# Patient Record
Sex: FEMALE | Race: BLACK OR AFRICAN AMERICAN | Employment: OTHER | URBAN - METROPOLITAN AREA
[De-identification: names, ages, dates, MRNs, and addresses within clinical notes are randomized per-mention and may not be internally consistent; named-entity substitution may affect disease eponyms.]

---

## 2017-02-12 ENCOUNTER — HOSPITAL ENCOUNTER (EMERGENCY)
Age: 67
Discharge: HOME | End: 2017-02-12
Attending: EMERGENCY MEDICINE
Payer: MEDICARE

## 2017-02-12 VITALS
TEMPERATURE: 101.2 F | BODY MASS INDEX: 47.09 KG/M2 | SYSTOLIC BLOOD PRESSURE: 164 MMHG | RESPIRATION RATE: 20 BRPM | DIASTOLIC BLOOD PRESSURE: 96 MMHG | OXYGEN SATURATION: 96 % | WEIGHT: 293 LBS | HEIGHT: 66 IN

## 2017-02-12 DIAGNOSIS — B34.9 VIRAL ILLNESS: Primary | ICD-10-CM

## 2017-02-12 DIAGNOSIS — I10 ESSENTIAL HYPERTENSION: ICD-10-CM

## 2017-02-12 DIAGNOSIS — R50.81 FEVER IN OTHER DISEASES: ICD-10-CM

## 2017-02-12 PROCEDURE — 99282 EMERGENCY DEPT VISIT SF MDM: CPT

## 2017-02-12 PROCEDURE — 74011250637 HC RX REV CODE- 250/637: Performed by: PHYSICIAN ASSISTANT

## 2017-02-12 PROCEDURE — 74011250637 HC RX REV CODE- 250/637: Performed by: EMERGENCY MEDICINE

## 2017-02-12 RX ORDER — IBUPROFEN 600 MG/1
600 TABLET ORAL
Status: COMPLETED | OUTPATIENT
Start: 2017-02-12 | End: 2017-02-12

## 2017-02-12 RX ORDER — ACETAMINOPHEN 325 MG/1
650 TABLET ORAL
Status: COMPLETED | OUTPATIENT
Start: 2017-02-12 | End: 2017-02-12

## 2017-02-12 RX ORDER — FLUTICASONE PROPIONATE 50 MCG
2 SPRAY, SUSPENSION (ML) NASAL DAILY
Qty: 1 BOTTLE | Refills: 0 | Status: SHIPPED | OUTPATIENT
Start: 2017-02-12 | End: 2017-08-09

## 2017-02-12 RX ORDER — HYDROCODONE POLISTIREX AND CHLORPHENIRAMINE POLISTIREX 10; 8 MG/5ML; MG/5ML
5 SUSPENSION, EXTENDED RELEASE ORAL
Qty: 60 ML | Refills: 0 | Status: SHIPPED | OUTPATIENT
Start: 2017-02-12 | End: 2017-04-12

## 2017-02-12 RX ADMIN — ACETAMINOPHEN 650 MG: 325 TABLET, FILM COATED ORAL at 15:00

## 2017-02-12 RX ADMIN — IBUPROFEN 600 MG: 600 TABLET, FILM COATED ORAL at 16:06

## 2017-02-12 NOTE — ED PROVIDER NOTES
HPI Comments: Haseeb Kenyon is a 77 y.o. female th tpresents to the ED with a complaint of cough fever and headache x3 days. Patient states that she has not taken any home medications. Denies CP, SOB, NVD. Patient is a 77 y.o. female presenting with cough, headaches, and fever. Cough   Associated symptoms include headaches. Headache    Associated symptoms include a fever. Fever    Associated symptoms include headaches and cough. Past Medical History:   Diagnosis Date    Burning with urination     Essential hypertension        Past Surgical History:   Procedure Laterality Date    Hx hysterectomy           No family history on file. Social History     Social History    Marital status: SINGLE     Spouse name: N/A    Number of children: N/A    Years of education: N/A     Occupational History    Not on file. Social History Main Topics    Smoking status: Never Smoker    Smokeless tobacco: Never Used    Alcohol use No    Drug use: No    Sexual activity: Not on file     Other Topics Concern    Not on file     Social History Narrative         ALLERGIES: Review of patient's allergies indicates no known allergies. Review of Systems   Constitutional: Positive for fever. Respiratory: Positive for cough. Neurological: Positive for headaches. All other systems reviewed and are negative. Vitals:    02/12/17 1456   BP: (!) 164/96   Resp: 20   Temp: (!) 101.2 °F (38.4 °C)   SpO2: 96%   Weight: 149.7 kg (330 lb)   Height: 5' 6\" (1.676 m)            Physical Exam   Constitutional: She is oriented to person, place, and time. She appears well-developed and well-nourished. She appears distressed. Patient appears obese     HENT:   Head: Normocephalic and atraumatic. Right Ear: External ear normal.   Left Ear: External ear normal.   Nose: Mucosal edema present. mallampatti score 4    Eyes: Conjunctivae are normal. Pupils are equal, round, and reactive to light.    Neck: Normal range of motion. Neck supple. Cardiovascular: Normal rate, regular rhythm and normal heart sounds. Pulmonary/Chest: Effort normal and breath sounds normal. No respiratory distress. Musculoskeletal: Normal range of motion. Neurological: She is alert and oriented to person, place, and time. Skin: Skin is warm and dry. Psychiatric: She has a normal mood and affect. Her behavior is normal.        MDM  Number of Diagnoses or Management Options  Fever in other diseases:   Viral illness:   Diagnosis management comments: BP elevated in ED- bP states that she has not taken any of her medications today.     Impression: viral illness, fever    Plan: Discharge home  Increase fluids  Afrin and Flonase as directed  Tylenol and motrin for fever  Cough medication as directed  Follow up with PCP    ED Course       Procedures

## 2017-02-12 NOTE — ED NOTES
Both written and verbal discharge instructions given to pt with verbalized understanding of home care and follow up. Prescription given.

## 2017-02-12 NOTE — DISCHARGE INSTRUCTIONS
Learning About Fever  What is a fever? A fever is a high body temperature. It's one way your body fights being sick. A fever shows that the body is responding to infection or other illnesses, both minor and severe. A fever is a symptom, not an illness by itself. A fever can be a sign that you are ill, but most fevers are not caused by a serious problem. You may have a fever with a minor illness, such as a cold. But sometimes a very serious infection may cause little or no fever. It is important to look at other symptoms, other conditions you have, and how you feel in general. In children, notice how they act and see what symptoms they complain of. What is a normal body temperature? A normal body temperature is about 98. 6ºF. Some people have a normal temperature that is a little higher or a little lower than this. Your temperature may be a little lower in the morning than it is later in the day. It may go up during hot weather or when you exercise, wear heavy clothes, or take a hot bath. Your temperature may also be different depending on how you take it. A temperature taken in the mouth (oral) or under the arm may be a little lower than your core temperature (rectal). What is a fever temperature? A core temperature of 100.4°F or above is considered a fever. What can cause a fever? A fever may be caused by:  · Infections. This is the most common cause of a fever. Examples of infections that can cause a fever include the flu, a kidney infection, or pneumonia. · Some medicines. · Severe trauma or injury, such as a heart attack, stroke, heatstroke, or burns. · Other medical conditions, such as arthritis and some cancers. How can you treat a fever at home? · Ask your doctor if you can take an over-the-counter pain medicine, such as acetaminophen (Tylenol), ibuprofen (Advil, Motrin), or naproxen (Aleve). Be safe with medicines. Read and follow all instructions on the label.   · To prevent dehydration, drink plenty of fluids. Choose water and other caffeine-free clear liquids until you feel better. If you have kidney, heart, or liver disease and have to limit fluids, talk with your doctor before you increase the amount of fluids you drink. Follow-up care is a key part of your treatment and safety. Be sure to make and go to all appointments, and call your doctor if you are having problems. It's also a good idea to know your test results and keep a list of the medicines you take. Where can you learn more? Go to http://idalia-maddison.info/. Enter A149 in the search box to learn more about \"Learning About Fever. \"  Current as of: May 27, 2016  Content Version: 11.1  © 6865-2636 [employer]. Care instructions adapted under license by [employer] (which disclaims liability or warranty for this information). If you have questions about a medical condition or this instruction, always ask your healthcare professional. [address] any warranty or liability for your use of this information. Viral Infections: Care Instructions  Your Care Instructions  You don't feel well, but it's not clear what's causing it. You may have a viral infection. Viruses cause many illnesses, such as the common cold, influenza, fever, rashes, and the diarrhea, nausea, and vomiting that are often called \"stomach flu. \" You may wonder if antibiotic medicines could make you feel better. But antibiotics only treat infections caused by bacteria. They don't work on viruses. The good news is that viral infections usually aren't serious. Most will go away in a few days without medical treatment. In the meantime, there are a few things you can do to make yourself more comfortable. Follow-up care is a key part of your treatment and safety. Be sure to make and go to all appointments, and call your doctor if you are having problems.  It's also a good idea to know your test results and keep a list of the medicines you take. How can you care for yourself at home? · Get plenty of rest if you feel tired. · Take an over-the-counter pain medicine if needed, such as acetaminophen (Tylenol), ibuprofen (Advil, Motrin), or naproxen (Aleve). Read and follow all instructions on the label. · Be careful when taking over-the-counter cold or flu medicines and Tylenol at the same time. Many of these medicines have acetaminophen, which is Tylenol. Read the labels to make sure that you are not taking more than the recommended dose. Too much acetaminophen (Tylenol) can be harmful. · Drink plenty of fluids, enough so that your urine is light yellow or clear like water. If you have kidney, heart, or liver disease and have to limit fluids, talk with your doctor before you increase the amount of fluids you drink. · Stay home from work, school, and other public places while you have a fever. When should you call for help? Call 911 anytime you think you may need emergency care. For example, call if:  · You have severe trouble breathing. · You passed out (lost consciousness). Call your doctor now or seek immediate medical care if:  · You seem to be getting much sicker. · You have a new or higher fever. · You have blood in your stools. · You have new belly pain, or your pain gets worse. · You have a new rash. Watch closely for changes in your health, and be sure to contact your doctor if:  · You start to get better and then get worse. · You do not get better as expected. Where can you learn more? Go to http://idalia-maddison.info/. Enter I311 in the search box to learn more about \"Viral Infections: Care Instructions. \"  Current as of: May 24, 2016  Content Version: 11.1  © 4336-8734 [employer]. Care instructions adapted under license by [employer] (which disclaims liability or warranty for this information).  If you have questions about a medical condition or this instruction, always ask your healthcare professional. [place] [address] any warranty or liability for your use of this information.

## 2017-04-24 ENCOUNTER — ANESTHESIA EVENT (OUTPATIENT)
Dept: ENDOSCOPY | Age: 67
End: 2017-04-24
Payer: MEDICARE

## 2017-04-25 ENCOUNTER — HOSPITAL ENCOUNTER (OUTPATIENT)
Age: 67
Setting detail: OUTPATIENT SURGERY
Discharge: HOME | End: 2017-04-25
Attending: INTERNAL MEDICINE | Admitting: INTERNAL MEDICINE
Payer: MEDICARE

## 2017-04-25 ENCOUNTER — ANESTHESIA (OUTPATIENT)
Dept: ENDOSCOPY | Age: 67
End: 2017-04-25
Payer: MEDICARE

## 2017-04-25 VITALS
BODY MASS INDEX: 47.09 KG/M2 | OXYGEN SATURATION: 96 % | DIASTOLIC BLOOD PRESSURE: 67 MMHG | SYSTOLIC BLOOD PRESSURE: 122 MMHG | HEIGHT: 66 IN | HEART RATE: 73 BPM | WEIGHT: 293 LBS | TEMPERATURE: 97.4 F | RESPIRATION RATE: 17 BRPM

## 2017-04-25 LAB
GLUCOSE BLD STRIP.AUTO-MCNC: 108 MG/DL (ref 70–110)
GLUCOSE BLD STRIP.AUTO-MCNC: 115 MG/DL (ref 70–110)

## 2017-04-25 PROCEDURE — 74011000250 HC RX REV CODE- 250: Performed by: NURSE ANESTHETIST, CERTIFIED REGISTERED

## 2017-04-25 PROCEDURE — 76040000019: Performed by: INTERNAL MEDICINE

## 2017-04-25 PROCEDURE — 88305 TISSUE EXAM BY PATHOLOGIST: CPT | Performed by: INTERNAL MEDICINE

## 2017-04-25 PROCEDURE — 74011250636 HC RX REV CODE- 250/636

## 2017-04-25 PROCEDURE — 77030018846 HC SOL IRR STRL H20 ICUM -A: Performed by: INTERNAL MEDICINE

## 2017-04-25 PROCEDURE — 77030009426 HC FCPS BIOP ENDOSC BSC -B: Performed by: INTERNAL MEDICINE

## 2017-04-25 PROCEDURE — 77030008565 HC TBNG SUC IRR ERBE -B: Performed by: INTERNAL MEDICINE

## 2017-04-25 PROCEDURE — 82962 GLUCOSE BLOOD TEST: CPT

## 2017-04-25 PROCEDURE — 74011000250 HC RX REV CODE- 250

## 2017-04-25 PROCEDURE — 76060000031 HC ANESTHESIA FIRST 0.5 HR: Performed by: INTERNAL MEDICINE

## 2017-04-25 PROCEDURE — 74011250636 HC RX REV CODE- 250/636: Performed by: NURSE ANESTHETIST, CERTIFIED REGISTERED

## 2017-04-25 RX ORDER — SODIUM CHLORIDE 0.9 % (FLUSH) 0.9 %
5-10 SYRINGE (ML) INJECTION AS NEEDED
Status: DISCONTINUED | OUTPATIENT
Start: 2017-04-25 | End: 2017-04-25 | Stop reason: HOSPADM

## 2017-04-25 RX ORDER — SODIUM CHLORIDE, SODIUM LACTATE, POTASSIUM CHLORIDE, CALCIUM CHLORIDE 600; 310; 30; 20 MG/100ML; MG/100ML; MG/100ML; MG/100ML
50 INJECTION, SOLUTION INTRAVENOUS CONTINUOUS
Status: DISCONTINUED | OUTPATIENT
Start: 2017-04-25 | End: 2017-04-25 | Stop reason: HOSPADM

## 2017-04-25 RX ORDER — ONDANSETRON 2 MG/ML
4 INJECTION INTRAMUSCULAR; INTRAVENOUS ONCE
Status: DISCONTINUED | OUTPATIENT
Start: 2017-04-25 | End: 2017-04-25 | Stop reason: HOSPADM

## 2017-04-25 RX ORDER — PROPOFOL 10 MG/ML
INJECTION, EMULSION INTRAVENOUS AS NEEDED
Status: DISCONTINUED | OUTPATIENT
Start: 2017-04-25 | End: 2017-04-25 | Stop reason: HOSPADM

## 2017-04-25 RX ORDER — ACETAMINOPHEN 500 MG
1000 TABLET ORAL AS NEEDED
COMMUNITY
End: 2017-08-09

## 2017-04-25 RX ORDER — PROPOFOL 10 MG/ML
INJECTION, EMULSION INTRAVENOUS
Status: DISCONTINUED | OUTPATIENT
Start: 2017-04-25 | End: 2017-04-25 | Stop reason: HOSPADM

## 2017-04-25 RX ORDER — MAGNESIUM SULFATE 100 %
4 CRYSTALS MISCELLANEOUS AS NEEDED
Status: DISCONTINUED | OUTPATIENT
Start: 2017-04-25 | End: 2017-04-25 | Stop reason: HOSPADM

## 2017-04-25 RX ORDER — SODIUM CHLORIDE, SODIUM LACTATE, POTASSIUM CHLORIDE, CALCIUM CHLORIDE 600; 310; 30; 20 MG/100ML; MG/100ML; MG/100ML; MG/100ML
75 INJECTION, SOLUTION INTRAVENOUS CONTINUOUS
Status: DISCONTINUED | OUTPATIENT
Start: 2017-04-25 | End: 2017-04-25 | Stop reason: HOSPADM

## 2017-04-25 RX ORDER — LIDOCAINE HYDROCHLORIDE 20 MG/ML
INJECTION, SOLUTION EPIDURAL; INFILTRATION; INTRACAUDAL; PERINEURAL AS NEEDED
Status: DISCONTINUED | OUTPATIENT
Start: 2017-04-25 | End: 2017-04-25 | Stop reason: HOSPADM

## 2017-04-25 RX ORDER — DEXTROSE 50 % IN WATER (D50W) INTRAVENOUS SYRINGE
25-50 AS NEEDED
Status: DISCONTINUED | OUTPATIENT
Start: 2017-04-25 | End: 2017-04-25 | Stop reason: HOSPADM

## 2017-04-25 RX ORDER — FAMOTIDINE 10 MG/ML
20 INJECTION INTRAVENOUS ONCE
Status: COMPLETED | OUTPATIENT
Start: 2017-04-25 | End: 2017-04-25

## 2017-04-25 RX ORDER — LIDOCAINE HYDROCHLORIDE 10 MG/ML
0.1 INJECTION, SOLUTION EPIDURAL; INFILTRATION; INTRACAUDAL; PERINEURAL AS NEEDED
Status: DISCONTINUED | OUTPATIENT
Start: 2017-04-25 | End: 2017-04-25 | Stop reason: HOSPADM

## 2017-04-25 RX ORDER — INSULIN LISPRO 100 [IU]/ML
INJECTION, SOLUTION INTRAVENOUS; SUBCUTANEOUS ONCE
Status: DISCONTINUED | OUTPATIENT
Start: 2017-04-25 | End: 2017-04-25 | Stop reason: HOSPADM

## 2017-04-25 RX ADMIN — LIDOCAINE HYDROCHLORIDE 80 MG: 20 INJECTION, SOLUTION EPIDURAL; INFILTRATION; INTRACAUDAL; PERINEURAL at 09:44

## 2017-04-25 RX ADMIN — PROPOFOL 100 MCG/KG/MIN: 10 INJECTION, EMULSION INTRAVENOUS at 09:44

## 2017-04-25 RX ADMIN — PROPOFOL 50 MG: 10 INJECTION, EMULSION INTRAVENOUS at 09:49

## 2017-04-25 RX ADMIN — SODIUM CHLORIDE, SODIUM LACTATE, POTASSIUM CHLORIDE, AND CALCIUM CHLORIDE 50 ML/HR: 600; 310; 30; 20 INJECTION, SOLUTION INTRAVENOUS at 08:45

## 2017-04-25 RX ADMIN — FAMOTIDINE 20 MG: 10 INJECTION, SOLUTION INTRAVENOUS at 08:45

## 2017-04-25 RX ADMIN — PROPOFOL 50 MG: 10 INJECTION, EMULSION INTRAVENOUS at 09:44

## 2017-04-25 NOTE — ANESTHESIA POSTPROCEDURE EVALUATION
Post-Anesthesia Evaluation and Assessment    Patient: Ottoniel Lechuga MRN: [id number]  SSN: [id number]    YOB: 1950  Age: 79 y.o. Sex: female       Cardiovascular Function/Vital Signs  Visit Vitals    /58    Pulse 77    Temp 36.7 °C (98 °F)    Resp 21    Ht 5' 6\" (1.676 m)    Wt 148.1 kg (326 lb 8 oz)    SpO2 99%    BMI 52.7 kg/m2       Patient is status post MAC anesthesia for Procedure(s):  COLONOSCOPY with polyp biopsy. Nausea/Vomiting: None    Postoperative hydration reviewed and adequate. Pain:  Pain Scale 1: Numeric (0 - 10) (04/25/17 1000)  Pain Intensity 1: 0 (04/25/17 1000)   Managed    Neurological Status:   Neuro (WDL): Within Defined Limits (04/25/17 0858)   At baseline    Mental Status and Level of Consciousness: Arousable    Pulmonary Status:   O2 Device: Room air (04/25/17 1008)   Adequate oxygenation and airway patent    Complications related to anesthesia: None    Post-anesthesia assessment completed.  No concerns    Signed By: Alphonso White MD     April 25, 2017

## 2017-04-25 NOTE — H&P
[email or web address]  [phone]    Gastroenterology pre op History and Physical     Impression:   1. High risk colon cancer screening exam      Plan:     1. Colonoscopy      Chief Complaint: high risk colon cancer screening exam.      HPI:  Dmitry Norton is a 79 y.o. female who is being seen on consult for high risk colon cancer screening with colonoscopy. There is a previous history of colon polyps, and the patient returns for a surveillence exam    PMH:   Past Medical History:   Diagnosis Date    Burning with urination     Diabetes ([address]     prediabetes    Essential hypertension     Ill-defined condition     High Cholesterol       PSH:   Past Surgical History:   Procedure Laterality Date    HX COLONOSCOPY      HX HYSTERECTOMY         Social HX:   Social History     Social History    Marital status: SINGLE     Spouse name: N/A    Number of children: N/A    Years of education: N/A     Occupational History    Not on file. Social History Main Topics    Smoking status: Never Smoker    Smokeless tobacco: Never Used    Alcohol use No    Drug use: No    Sexual activity: Not on file     Other Topics Concern    Not on file     Social History Narrative       FHX:   History reviewed. No pertinent family history. Allergy:   No Known Allergies    Home Medications:     Prescriptions Prior to Admission   Medication Sig    acetaminophen (TYLENOL EXTRA STRENGTH) 500 mg tablet Take 1,000 mg by mouth as needed for Pain.  losartan (COZAAR) 100 mg tablet Take 100 mg by mouth nightly.  amLODIPine (NORVASC) 10 mg tablet Take  by mouth nightly.  rosuvastatin (CRESTOR) 10 mg tablet Take 10 mg by mouth nightly.  aspirin delayed-release 81 mg tablet Take  by mouth daily.  Cholecalciferol, Vitamin D3, 1,000 unit cap Take  by mouth.  fluticasone (FLONASE) 50 mcg/actuation nasal spray 2 Sprays by Both Nostrils route daily.        Review of Systems:     Constitutional: No fevers, chills, weight loss, fatigue. Skin: No rashes, pruritis, jaundice, ulcerations, erythema. HENT: No headaches, nosebleeds, sinus pressure, rhinorrhea, sore throat. Eyes: No visual changes, blurred vision, eye pain, photophobia, jaundice. Cardiovascular: No chest pain, heart palpitations. Respiratory: No cough, SOB, wheezing, chest discomfort, orthopnea. Gastrointestinal:    Genitourinary: No dysuria, bleeding, discharge, pyuria. Musculoskeletal: No weakness, arthralgias, wasting. Endo: No sweats. Heme: No bruising, easy bleeding. Allergies: As noted. Neurological: Cranial nerves intact. Alert and oriented. Gait not assessed. Psychiatric:  No anxiety, depression, hallucinations. Visit Vitals    /87 (BP 1 Location: Left arm, BP Patient Position: At rest)    Pulse 88    Temp 98.4 °F (36.9 °C)    Resp 20    Ht 5' 6\" (1.676 m)    Wt 148.1 kg (326 lb 8 oz)    SpO2 94%    BMI 52.7 kg/m2       Physical Assessment:     constitutional: appearance: well developed, well nourished, normal habitus, no deformities, in no acute distress. skin: inspection: no rashes, ulcers, icterus or other lesions; no clubbing or telangiectasias. palpation: no induration or subcutaneos nodules. eyes: inspection: normal conjunctivae and lids; no jaundice pupils: symmetrical, normoreactive to light, normal accommodation and size. ENMT: mouth: normal oral mucosa,lips and gums; good dentition. oropharynx: normal tongue, hard and soft palate; posterior pharynx without erithema, exudate or lesions. neck: thyroid: normal size, consistency and position; no masses or tenderness. respiratory: effort: normal chest excursion; no intercostal retraction or accessory muscle use. cardiovascular: abdominal aorta: normal size and position; no bruits. palpation: PMI of normal size and position; normal rhythm; no thrill or murmurs. abdominal: abdomen: normal consistency; no tenderness or masses.  hernias: no hernias appreciated. liver: normal size and consistency. spleen: not palpable. rectal: hemoccult/guaiac: not performed. musculoskeletal: digits and nails: no clubbing, cyanosis, petechiae or other inflammatory conditions. gait: normal gait and station head and neck: normal range of motion; no pain, crepitation or contracture. spine/ribs/pelvis: normal range of motion; no pain, deformity or contracture. lymphatic: axilae: not palpable. groin: not palpable. neck: within normal limits. other: not palpable. neurologic: cranial nerves: II-XII normal.   psychiatric: judgement/insight: within normal limits. memory: within normal limits for recent and remote events. mood and affect: no evidence of depression, anxiety or agitation. orientation: oriented to time, space and person. Basic Metabolic Profile   No results for input(s): NA, K, CL, CO2, BUN, GLU, CA, MG, PHOS in the last 72 hours. No lab exists for component: CREAT      CBC w/Diff    No results for input(s): WBC, RBC, HGB, HCT, MCV, MCH, MCHC, RDW, PLT, HGBEXT, HCTEXT, PLTEXT in the last 72 hours. No lab exists for component: MPV No results for input(s): GRANS, LYMPH, EOS, PRO, MYELO, METAS, BLAST in the last 72 hours. No lab exists for component: MONO, BASO     Hepatic Function   No results for input(s): ALB, TP, TBILI, GPT, SGOT, AP, AML, LPSE in the last 72 hours. No lab exists for component: JESSICA Alegria MD  [facility], [address] [place] [address]  [email or web address]

## 2017-04-25 NOTE — IP AVS SNAPSHOT
[address] [facility] 
 
 
 [address] [facility] [address] 
[phone] Patient: Kacy Kim MRN: [id number] FQI:[id number]/[id number] You are allergic to the following No active allergies Recent Documentation Height Weight BMI OB Status Smoking Status 1.676 m 148.1 kg 52.7 kg/m2 Hysterectomy Never Smoker Emergency Contacts Name Discharge Info Relation Home Work Mobile Rand Tello (Niece)  Other Relative [6] [phone] Justyna Sandhu DISCHARGE CAREGIVER [3] Friend [5] [phone] About your hospitalization You were admitted on:  April 25, 2017 You last received care in the:  [facility] PAC[facility] You were discharged on:  April 25, 2017 Unit phone number:  [phone] Why you were hospitalized Your primary diagnosis was:  Not on File Providers Seen During Your Hospitalizations Provider Role Specialty Primary office phone Ute Sims MD Attending Provider Gastroenterology [phone] Your Primary Care Physician (PCP) Primary Care Physician Office Phone Office Fax [phone] Jewel Nguyen,[facility], [address] [facility] [phone] Follow-up Information Follow up With Details Comments Contact Info Ana Thompson MD   [address] [place] [address] [phone] Ute Sims MD  Follow up as needed. Repeat colonoscopy in 5 years. [address] [place] [address] Drive [address] [facility] [address] 
[phone] Current Discharge Medication List  
  
CONTINUE these medications which have NOT CHANGED Dose & Instructions Dispensing Information Comments Morning Noon Evening Bedtime  
 aspirin delayed-release 81 mg tablet Your last dose was: Your next dose is: Take  by mouth daily. Refills:  0  
     
   
   
   
  
 cholecalciferol 1,000 unit Cap Commonly known as:  VITAMIN D3 Your last dose was: Your next dose is: Take  by mouth. Refills:  0  
     
   
   
   
  
 COZAAR 100 mg tablet Generic drug:  losartan Your last dose was: Your next dose is:    
   
   
 Dose:  100 mg Take 100 mg by mouth nightly. Refills:  0  
     
   
   
   
  
 CRESTOR 10 mg tablet Generic drug:  rosuvastatin Your last dose was: Your next dose is:    
   
   
 Dose:  10 mg Take 10 mg by mouth nightly. Refills:  0  
     
   
   
   
  
 fluticasone 50 mcg/actuation nasal spray Commonly known as:  Antonieta Earnest Your last dose was: Your next dose is:    
   
   
 Dose:  2 Spray 2 Sprays by Both Nostrils route daily. Quantity:  1 Bottle Refills:  0 NORVASC 10 mg tablet Generic drug:  amLODIPine Your last dose was: Your next dose is: Take  by mouth nightly. Refills:  0  
     
   
   
   
  
 TYLENOL EXTRA STRENGTH 500 mg tablet Generic drug:  acetaminophen Your last dose was: Your next dose is:    
   
   
 Dose:  1000 mg Take 1,000 mg by mouth as needed for Pain. Refills:  0 Discharge Instructions Colonoscopy: What to Expect at [facility] Your Recovery After you have a colonoscopy, you will stay at the clinic for 1 to 2 hours until the medicines wear off. Then you can go home. But you will need to arrange for a ride. Your doctor will tell you when you can eat and do your other usual activities. Your doctor will talk to you about when you will need your next colonoscopy. Your doctor can help you decide how often you need to be checked. This will depend on the results of your test and your risk for colorectal cancer. After the test, you may be bloated or have gas pains. You may need to pass gas.  If a biopsy was done or a polyp was removed, you may have streaks of blood in your stool (feces) for a few days. This care sheet gives you a general idea about how long it will take for you to recover. But each person recovers at a different pace. Follow the steps below to get better as quickly as possible. How can you care for yourself at home? Activity · Rest when you feel tired. · You can do your normal activities when it feels okay to do so. Diet · Follow your doctor's directions for eating. · Unless your doctor has told you not to, drink plenty of fluids. This helps to replace the fluids that were lost during the colon prep. · Do not drink alcohol. Medicines · Your doctor will tell you if and when you can restart your medicines. He or she will also give you instructions about taking any new medicines. · If you take blood thinners, such as warfarin (Coumadin), clopidogrel (Plavix), or aspirin, be sure to talk to your doctor. He or she will tell you if and when to start taking those medicines again. Make sure that you understand exactly what your doctor wants you to do. · If polyps were removed or a biopsy was done during the test, your doctor may tell you not to take aspirin or other anti-inflammatory medicines for a few days. These include ibuprofen (Advil, Motrin) and naproxen (Aleve). Other instructions · For your safety, do not drive or operate machinery until the medicine wears off and you can think clearly. Your doctor may tell you not to drive or operate machinery until the day after your test. 
· Do not sign legal documents or make major decisions until the medicine wears off and you can think clearly. The anesthesia can make it hard for you to fully understand what you are agreeing to. Follow-up care is a key part of your treatment and safety. Be sure to make and go to all appointments, and call your doctor if you are having problems. It's also a good idea to know your test results and keep a list of the medicines you take. When should you call for help? Call 911 anytime you think you may need emergency care. For example, call if: 
· You passed out (lost consciousness). · You pass maroon or bloody stools. · You have severe belly pain. Call your doctor now or seek immediate medical care if: 
· Your stools are black and tarlike. · Your stools have streaks of blood, but you did not have a biopsy or any polyps removed. · You have belly pain, or your belly is swollen and firm. · You vomit. · You have a fever. · You are very dizzy. Watch closely for changes in your health, and be sure to contact your doctor if you have any problems. Where can you learn more? Go to http://idalia-maddison.info/. Enter E264 in the search box to learn more about \"Colonoscopy: What to Expect at Home. \" Current as of: August 9, 2016 Content Version: 11.2 © 7092-4720 [employer]. Care instructions adapted under license by [employer] (which disclaims liability or warranty for this information). If you have questions about a medical condition or this instruction, always ask your healthcare professional. [place] [address] any warranty or liability for your use of this information. Colon Polyps: Care Instructions Your Care Instructions Colon polyps are growths in the colon or the rectum. The cause of most colon polyps is not known, and most people who get them do not have any problems. But a certain kind can turn into cancer. For this reason, regular testing for colon polyps is important for people age 48 and older and anyone who has an increased risk for colon cancer. Polyps are usually found through routine colon cancer screening tests. Although most colon polyps are not cancerous, they are usually removed and then tested for cancer. Screening for colon cancer saves lives because the cancer can usually be cured if it is caught early. If you have a polyp that is the type that can turn into cancer, you may need more tests to examine your entire colon. The doctor will remove any other polyps that he or she finds, and you will be tested more often. Follow-up care is a key part of your treatment and safety. Be sure to make and go to all appointments, and call your doctor if you are having problems. It's also a good idea to know your test results and keep a list of the medicines you take. How can you care for yourself at home? Regular exams to look for colon polyps are the best way to prevent polyps from turning into colon cancer. These can include stool tests, sigmoidoscopy, colonoscopy, and CT colonography. Talk with your doctor about a testing schedule that is right for you. To prevent polyps There is no home treatment that can prevent colon polyps. But these steps may help lower your risk for cancer. · Stay active. Being active can help you get to and stay at a healthy weight. Try to exercise on most days of the week. Walking is a good choice. · Eat well. Choose a variety of vegetables, fruits, legumes (such as peas and beans), fish, poultry, and whole grains. · Do not smoke. If you need help quitting, talk to your doctor about stop-smoking programs and medicines. These can increase your chances of quitting for good. · If you drink alcohol, limit how much you drink. Limit alcohol to 2 drinks a day for men and 1 drink a day for women. When should you call for help? Call your doctor now or seek immediate medical care if: 
· You have severe belly pain. · Your stools are maroon or very bloody. Watch closely for changes in your health, and be sure to contact your doctor if: 
· You have a fever. · You have nausea or vomiting. · You have a change in bowel habits (new constipation or diarrhea). · Your symptoms get worse or are not improving as expected. Where can you learn more? Go to http://idalia-maddison.info/. Enter 95 611919 in the search box to learn more about \"Colon Polyps: Care Instructions. \" Current as of: August 24, 2016 Content Version: 11.2 © 5867-6013 [employer]. Care instructions adapted under license by [employer] (which disclaims liability or warranty for this information). If you have questions about a medical condition or this instruction, always ask your healthcare professional. [place] [address] any warranty or liability for your use of this information. Learning About Diverticulosis and Diverticulitis What are diverticulosis and diverticulitis? In diverticulosis and diverticulitis, pouches called diverticula form in the wall of the large intestine, or colon. · In diverticulosis, the pouches do not cause any pain or other symptoms. · In diverticulitis, the pouches get inflamed or infected and cause symptoms. Doctors aren't sure what causes these pouches in the colon. But they think that a low-fiber diet may play a role. Without fiber to add bulk to the stool, the colon has to work harder than normal to push the stool forward. The pressure from this may cause pouches to form in weak spots along the colon. Some people with diverticulosis get diverticulitis. But experts don't know why this happens. What are the symptoms? · In diverticulosis, most people don't have symptoms. But pouches sometimes bleed. · In diverticulitis, symptoms may last from a few hours to a week or more. They include: ¨ Belly pain. This is usually in the lower left side. It is sometimes worse when you move. This is the most common symptom. ¨ Fever and chills. ¨ Bloating and gas. ¨ Diarrhea or constipation. ¨ Nausea and sometimes vomiting. ¨ Not feeling like eating. How can you prevent these problems? You may be able to lower your chance of getting diverticulitis. You can do this by taking steps to prevent constipation. · Eat fruits, vegetables, beans, and whole grains every day. These foods are high in fiber. · Drink plenty of fluids (enough so that your urine is light yellow or clear like water). If you have kidney, heart, or liver disease and have to limit fluids, talk with your doctor before you increase the amount of fluids you drink. · Get at least 30 minutes of exercise on most days of the week. Walking is a good choice. You also may want to do other activities, such as running, swimming, cycling, or playing tennis or team sports. · Take a fiber supplement, such as Citrucel or Metamucil, every day if needed. Read and follow all instructions on the label. · Schedule time each day for a bowel movement. Having a daily routine may help. Take your time and do not strain when having a bowel movement. Some people avoid nuts, seeds, berries, and popcorn. They believe that these foods might get trapped in the diverticula and cause pain. But there is no proof that these foods cause diverticulitis or make it worse. How are these problems treated? · The best way to treat diverticulosis is to avoid constipation. (See the tips above.) · Treatment for diverticulitis includes antibiotics and often a change in your diet. You may need only liquids at first. Your doctor may suggest pain medicines for pain or belly cramps. In some cases, surgery may be needed. Follow-up care is a key part of your treatment and safety. Be sure to make and go to all appointments, and call your doctor if you are having problems. It's also a good idea to know your test results and keep a list of the medicines you take. Where can you learn more? Go to http://idalia-maddison.info/. Enter M102 in the search box to learn more about \"Learning About Diverticulosis and Diverticulitis. \" Current as of: August 9, 2016 Content Version: 11.2 © 1808-8271 [employer], 10X10 Room.  Care instructions adapted under license by [address] (which disclaims liability or warranty for this information). If you have questions about a medical condition or this instruction, always ask your healthcare professional. [address] any warranty or liability for your use of this information. DISCHARGE SUMMARY from Nurse The following personal items are in your possession at time of discharge: 
 
Dental Appliances: None Visual Aid: Glasses PATIENT INSTRUCTIONS: 
 
 
F-face looks uneven A-arms unable to move or move unevenly S-speech slurred or non-existent T-time-call 911 as soon as signs and symptoms begin-DO NOT go Back to bed or wait to see if you get better-TIME IS BRAIN. Warning Signs of HEART ATTACK Call 911 if you have these symptoms: 
? Chest discomfort. Most heart attacks involve discomfort in the center of the chest that lasts more than a few minutes, or that goes away and comes back. It can feel like uncomfortable pressure, squeezing, fullness, or pain. ? Discomfort in other areas of the upper body. Symptoms can include pain or discomfort in one or both arms, the back, neck, jaw, or stomach. ? Shortness of breath with or without chest discomfort. ? Other signs may include breaking out in a cold sweat, nausea, or lightheadedness. Don't wait more than five minutes to call [address]! Fast action can save your life. Calling 911 is almost always the fastest way to get lifesaving treatment. Emergency Medical Services staff can begin treatment when they arrive  up to an hour sooner than if someone gets to the hospital by car. The discharge information has been reviewed with the patient. The patient verbalized understanding. Discharge medications reviewed with the patient and appropriate educational materials and side effects teaching were provided. Discharge Orders None Introducing [facility]! [employer] introduces Dahu patient portal. Now you can access parts of your medical record, email your doctor's office, and request medication refills online. 1. In your internet browser, go to [email or web address] 2. Click on the First Time User? Click Here link in the Sign In box. You will see the New Member Sign Up page. 3. Enter your Dahu Access Code exactly as it appears below. You will not need to use this code after youve completed the sign-up process. If you do not sign up before the expiration date, you must request a new code. · Dahu Access Code: [email or web address] Expires: 5/13/2017  3:00 PM 
 
4. Enter the last four digits of your Social Security Number (xxxx) and Date of Birth (mm/dd/yyyy) as indicated and click Submit. You will be taken to the next sign-up page. 5. Create a Dahu ID. This will be your Dahu login ID and cannot be changed, so think of one that is secure and easy to remember. 6. Create a Dahu password. You can change your password at any time. 7. Enter your Password Reset Question and Answer. This can be used at a later time if you forget your password. 8. Enter your e-mail address. You will receive e-mail notification when new information is available in [address] 9. Click Sign Up. You can now view and download portions of your medical record. 10. Click the Download Summary menu link to download a portable copy of your medical information. If you have questions, please visit the Frequently Asked Questions section of the Dahu website. Remember, Dahu is NOT to be used for urgent needs. For medical emergencies, dial 911. Now available from your iPhone and Android! General Information Please provide this summary of care documentation to your next provider. Patient Signature:  ____________________________________________________________ Date:  ____________________________________________________________  
  
[employer] [employer] Provider Signature:  ____________________________________________________________ Date:  ____________________________________________________________

## 2017-04-25 NOTE — ANESTHESIA PREPROCEDURE EVALUATION
Anesthetic History   No history of anesthetic complications            Review of Systems / Medical History  Patient summary reviewed and pertinent labs reviewed    Pulmonary  Within defined limits                 Neuro/Psych   Within defined limits           Cardiovascular  Within defined limits  Hypertension              Exercise tolerance: >4 METS     GI/Hepatic/Renal  Within defined limits              Endo/Other  Within defined limits  Diabetes         Other Findings   Comments:   Risk Factors for Postoperative nausea/vomiting:       History of postoperative nausea/vomiting? NO       Female? NO       Motion sickness? NO       Intended opioid administration for postoperative analgesia?   NO           Physical Exam    Airway  Mallampati: II  TM Distance: 4 - 6 cm  Neck ROM: normal range of motion   Mouth opening: Normal     Cardiovascular    Rhythm: regular  Rate: normal         Dental  No notable dental hx       Pulmonary  Breath sounds clear to auscultation               Abdominal  GI exam deferred       Other Findings            Anesthetic Plan    ASA: 3  Anesthesia type: general          Induction: Intravenous  Anesthetic plan and risks discussed with: Patient

## 2017-04-25 NOTE — PERIOP NOTES
Patient armband removed and shredded    Patient confirmed by two identifiers with discharge instructions prior to being provided to patient and friend [place].

## 2017-04-25 NOTE — DISCHARGE INSTRUCTIONS
Colonoscopy: What to Expect at [address] [place] [address]  After you have a colonoscopy, you will stay at the clinic for 1 to 2 hours until the medicines wear off. Then you can go home. But you will need to arrange for a ride. Your doctor will tell you when you can eat and do your other usual activities. Your doctor will talk to you about when you will need your next colonoscopy. Your doctor can help you decide how often you need to be checked. This will depend on the results of your test and your risk for colorectal cancer. After the test, you may be bloated or have gas pains. You may need to pass gas. If a biopsy was done or a polyp was removed, you may have streaks of blood in your stool (feces) for a few days. This care sheet gives you a general idea about how long it will take for you to recover. But each person recovers at a different pace. Follow the steps below to get better as quickly as possible. How can you care for yourself at home? Activity  · Rest when you feel tired. · You can do your normal activities when it feels okay to do so. Diet  · Follow your doctor's directions for eating. · Unless your doctor has told you not to, drink plenty of fluids. This helps to replace the fluids that were lost during the colon prep. · Do not drink alcohol. Medicines  · Your doctor will tell you if and when you can restart your medicines. He or she will also give you instructions about taking any new medicines. · If you take blood thinners, such as warfarin (Coumadin), clopidogrel (Plavix), or aspirin, be sure to talk to your doctor. He or she will tell you if and when to start taking those medicines again. Make sure that you understand exactly what your doctor wants you to do. · If polyps were removed or a biopsy was done during the test, your doctor may tell you not to take aspirin or other anti-inflammatory medicines for a few days. These include ibuprofen (Advil, Motrin) and naproxen (Aleve).   Other instructions  · For your safety, do not drive or operate machinery until the medicine wears off and you can think clearly. Your doctor may tell you not to drive or operate machinery until the day after your test.  · Do not sign legal documents or make major decisions until the medicine wears off and you can think clearly. The anesthesia can make it hard for you to fully understand what you are agreeing to. Follow-up care is a key part of your treatment and safety. Be sure to make and go to all appointments, and call your doctor if you are having problems. It's also a good idea to know your test results and keep a list of the medicines you take. When should you call for help? Call 911 anytime you think you may need emergency care. For example, call if:  · You passed out (lost consciousness). · You pass maroon or bloody stools. · You have severe belly pain. Call your doctor now or seek immediate medical care if:  · Your stools are black and tarlike. · Your stools have streaks of blood, but you did not have a biopsy or any polyps removed. · You have belly pain, or your belly is swollen and firm. · You vomit. · You have a fever. · You are very dizzy. Watch closely for changes in your health, and be sure to contact your doctor if you have any problems. Where can you learn more? Go to http://idalia-maddison.info/. Enter E264 in the search box to learn more about \"Colonoscopy: What to Expect at Home. \"  Current as of: August 9, 2016  Content Version: 11.2  © 7647-5517 Healthwise, [employer]. Care instructions adapted under license by [employer] (which disclaims liability or warranty for this information). If you have questions about a medical condition or this instruction, always ask your healthcare professional. [address] any warranty or liability for your use of this information.   Colon Polyps: Care Instructions  Your Care Instructions    Colon polyps are growths in the colon or the rectum. The cause of most colon polyps is not known, and most people who get them do not have any problems. But a certain kind can turn into cancer. For this reason, regular testing for colon polyps is important for people age 48 and older and anyone who has an increased risk for colon cancer. Polyps are usually found through routine colon cancer screening tests. Although most colon polyps are not cancerous, they are usually removed and then tested for cancer. Screening for colon cancer saves lives because the cancer can usually be cured if it is caught early. If you have a polyp that is the type that can turn into cancer, you may need more tests to examine your entire colon. The doctor will remove any other polyps that he or she finds, and you will be tested more often. Follow-up care is a key part of your treatment and safety. Be sure to make and go to all appointments, and call your doctor if you are having problems. It's also a good idea to know your test results and keep a list of the medicines you take. How can you care for yourself at home? Regular exams to look for colon polyps are the best way to prevent polyps from turning into colon cancer. These can include stool tests, sigmoidoscopy, colonoscopy, and CT colonography. Talk with your doctor about a testing schedule that is right for you. To prevent polyps  There is no home treatment that can prevent colon polyps. But these steps may help lower your risk for cancer. · Stay active. Being active can help you get to and stay at a healthy weight. Try to exercise on most days of the week. Walking is a good choice. · Eat well. Choose a variety of vegetables, fruits, legumes (such as peas and beans), fish, poultry, and whole grains. · Do not smoke. If you need help quitting, talk to your doctor about stop-smoking programs and medicines. These can increase your chances of quitting for good.   · If you drink alcohol, limit how much you drink. Limit alcohol to 2 drinks a day for men and 1 drink a day for women. When should you call for help? Call your doctor now or seek immediate medical care if:  · You have severe belly pain. · Your stools are maroon or very bloody. Watch closely for changes in your health, and be sure to contact your doctor if:  · You have a fever. · You have nausea or vomiting. · You have a change in bowel habits (new constipation or diarrhea). · Your symptoms get worse or are not improving as expected. Where can you learn more? Go to http://idalia-maddison.info/. Enter 95 [id number] in the search box to learn more about \"Colon Polyps: Care Instructions. \"  Current as of: August 24, 2016  Content Version: 11.2  © 1479-4686 [employer]. Care instructions adapted under license by [employer] (which disclaims liability or warranty for this information). If you have questions about a medical condition or this instruction, always ask your healthcare professional. [place] [address] any warranty or liability for your use of this information. Learning About Diverticulosis and Diverticulitis  What are diverticulosis and diverticulitis? In diverticulosis and diverticulitis, pouches called diverticula form in the wall of the large intestine, or colon. · In diverticulosis, the pouches do not cause any pain or other symptoms. · In diverticulitis, the pouches get inflamed or infected and cause symptoms. Doctors aren't sure what causes these pouches in the colon. But they think that a low-fiber diet may play a role. Without fiber to add bulk to the stool, the colon has to work harder than normal to push the stool forward. The pressure from this may cause pouches to form in weak spots along the colon. Some people with diverticulosis get diverticulitis. But experts don't know why this happens. What are the symptoms?   · In diverticulosis, most people don't have symptoms. But pouches sometimes bleed. · In diverticulitis, symptoms may last from a few hours to a week or more. They include:  ¨ Belly pain. This is usually in the lower left side. It is sometimes worse when you move. This is the most common symptom. ¨ Fever and chills. ¨ Bloating and gas. ¨ Diarrhea or constipation. ¨ Nausea and sometimes vomiting. ¨ Not feeling like eating. How can you prevent these problems? You may be able to lower your chance of getting diverticulitis. You can do this by taking steps to prevent constipation. · Eat fruits, vegetables, beans, and whole grains every day. These foods are high in fiber. · Drink plenty of fluids (enough so that your urine is light yellow or clear like water). If you have kidney, heart, or liver disease and have to limit fluids, talk with your doctor before you increase the amount of fluids you drink. · Get at least 30 minutes of exercise on most days of the week. Walking is a good choice. You also may want to do other activities, such as running, swimming, cycling, or playing tennis or team sports. · Take a fiber supplement, such as Citrucel or Metamucil, every day if needed. Read and follow all instructions on the label. · Schedule time each day for a bowel movement. Having a daily routine may help. Take your time and do not strain when having a bowel movement. Some people avoid nuts, seeds, berries, and popcorn. They believe that these foods might get trapped in the diverticula and cause pain. But there is no proof that these foods cause diverticulitis or make it worse. How are these problems treated? · The best way to treat diverticulosis is to avoid constipation. (See the tips above.)  · Treatment for diverticulitis includes antibiotics and often a change in your diet. You may need only liquids at first. Your doctor may suggest pain medicines for pain or belly cramps. In some cases, surgery may be needed.   Follow-up care is a key part of your treatment and safety. Be sure to make and go to all appointments, and call your doctor if you are having problems. It's also a good idea to know your test results and keep a list of the medicines you take. Where can you learn more? Go to http://idalia-maddison.info/. Enter R550 in the search box to learn more about \"Learning About Diverticulosis and Diverticulitis. \"  Current as of: August 9, 2016  Content Version: 11.2  © 5044-6613 [employer]. Care instructions adapted under license by [employer] (which disclaims liability or warranty for this information). If you have questions about a medical condition or this instruction, always ask your healthcare professional. [place] [address] any warranty or liability for your use of this information. DISCHARGE SUMMARY from Nurse    The following personal items are in your possession at time of discharge:    Dental Appliances: None  Visual Aid: Glasses                  PATIENT INSTRUCTIONS:    After general anesthesia or intravenous sedation, for 24 hours or while taking prescription Narcotics:  · Limit your activities  · Do not drive and operate hazardous machinery  · Do not make important personal or business decisions  · Do  not drink alcoholic beverages  · If you have not urinated within 8 hours after discharge, please contact your surgeon on call. Report the following to your surgeon:  · Excessive pain, swelling, redness or odor of or around the surgical area  · Temperature over 100.5  · Nausea and vomiting lasting longer than 4 hours or if unable to take medications  · Any signs of decreased circulation or nerve impairment to extremity: change in color, persistent  numbness, tingling, coldness or increase pain  · Any questions      *  Please give a list of your current medications to your Primary Care Provider.     *  Please update this list whenever your medications are discontinued, doses are changed, or new medications (including over-the-counter products) are added. *  Please carry medication information at all times in case of emergency situations. These are general instructions for a healthy lifestyle:    No smoking/ No tobacco products/ Avoid exposure to second hand smoke    Surgeon General's Warning:  Quitting smoking now greatly reduces serious risk to your health. Obesity, smoking, and sedentary lifestyle greatly increases your risk for illness    A healthy diet, regular physical exercise & weight monitoring are important for maintaining a healthy lifestyle    You may be retaining fluid if you have a history of heart failure or if you experience any of the following symptoms:  Weight gain of 3 pounds or more overnight or 5 pounds in a week, increased swelling in our hands or feet or shortness of breath while lying flat in bed. Please call your doctor as soon as you notice any of these symptoms; do not wait until your next office visit. Recognize signs and symptoms of STROKE:    F-face looks uneven    A-arms unable to move or move unevenly    S-speech slurred or non-existent    T-time-call 911 as soon as signs and symptoms begin-DO NOT go       Back to bed or wait to see if you get better-TIME IS BRAIN. Warning Signs of HEART ATTACK     Call 911 if you have these symptoms:   Chest discomfort. Most heart attacks involve discomfort in the center of the chest that lasts more than a few minutes, or that goes away and comes back. It can feel like uncomfortable pressure, squeezing, fullness, or pain.  Discomfort in other areas of the upper body. Symptoms can include pain or discomfort in one or both arms, the back, neck, jaw, or stomach.  Shortness of breath with or without chest discomfort.  Other signs may include breaking out in a cold sweat, nausea, or lightheadedness. Don't wait more than five minutes to call 911 - MINUTES MATTER! Fast action can save your life. Calling 911 is almost always the fastest way to get lifesaving treatment. Emergency Medical Services staff can begin treatment when they arrive -- up to an hour sooner than if someone gets to the hospital by car. The discharge information has been reviewed with the patient. The patient verbalized understanding. Discharge medications reviewed with the patient and appropriate educational materials and side effects teaching were provided.

## 2017-07-11 ENCOUNTER — HOSPITAL ENCOUNTER (OUTPATIENT)
Dept: GENERAL RADIOLOGY | Age: 67
Discharge: HOME | End: 2017-07-11
Payer: MEDICARE

## 2017-07-11 DIAGNOSIS — I10 HIGH BLOOD PRESSURE: ICD-10-CM

## 2017-07-11 DIAGNOSIS — M54.50 LOW BACK PAIN: ICD-10-CM

## 2017-07-11 PROCEDURE — 72100 X-RAY EXAM L-S SPINE 2/3 VWS: CPT

## 2017-07-11 PROCEDURE — 71020 XR CHEST PA LAT: CPT

## 2017-08-09 ENCOUNTER — OFFICE VISIT (OUTPATIENT)
Dept: CARDIOLOGY CLINIC | Age: 67
End: 2017-08-09

## 2017-08-09 VITALS
HEART RATE: 74 BPM | BODY MASS INDEX: 47.09 KG/M2 | SYSTOLIC BLOOD PRESSURE: 150 MMHG | DIASTOLIC BLOOD PRESSURE: 82 MMHG | OXYGEN SATURATION: 97 % | HEIGHT: 66 IN | WEIGHT: 293 LBS

## 2017-08-09 DIAGNOSIS — E66.01 MORBID OBESITY, UNSPECIFIED OBESITY TYPE (HCC): ICD-10-CM

## 2017-08-09 DIAGNOSIS — I10 ESSENTIAL HYPERTENSION: ICD-10-CM

## 2017-08-09 DIAGNOSIS — R06.09 DOE (DYSPNEA ON EXERTION): Primary | ICD-10-CM

## 2017-08-09 DIAGNOSIS — I11.9 HYPERTENSIVE HEART DISEASE WITHOUT HEART FAILURE: ICD-10-CM

## 2017-08-09 DIAGNOSIS — E78.5 DYSLIPIDEMIA: ICD-10-CM

## 2017-08-09 NOTE — MR AVS SNAPSHOT
Visit Information Date & Time Provider Department Dept. Phone Encounter #  
 8/9/2017  4:00 PM Bryn Watt MD [facility] [address] [id number] Upcoming Health Maintenance Date Due Hepatitis C Screening 1950 DTaP/Tdap/Td series (1 - Tdap) 4/5/1971 FOBT Q 1 YEAR AGE 50-75 4/5/2000 ZOSTER VACCINE AGE 60> 2/5/2010 GLAUCOMA SCREENING Q2Y 4/5/2015 OSTEOPOROSIS SCREENING (DEXA) 4/5/2015 Pneumococcal 65+ Low/Medium Risk (1 of 2 - PCV13) 4/5/2015 MEDICARE YEARLY EXAM 4/5/2015 INFLUENZA AGE 9 TO ADULT 8/1/2017 BREAST CANCER SCRN MAMMOGRAM 10/18/2018 Allergies as of 8/9/2017  Review Complete On: 8/9/2017 By: Carlin Bourgeois No Known Allergies Current Immunizations  Never Reviewed No immunizations on file. Not reviewed this visit You Were Diagnosed With   
  
 Codes Comments JESUS (dyspnea on exertion)    -  Primary ICD-10-CM: R06.09 
ICD-9-CM: 786.09 Vitals BP Pulse Height(growth percentile) Weight(growth percentile) SpO2 BMI  
 150/82 (BP 1 Location: Right arm, BP Patient Position: Sitting) 74 5' 6\" (1.676 m) 333 lb (151 kg) 97% 53.75 kg/m2 OB Status Smoking Status Hysterectomy Never Smoker Vitals History BMI and BSA Data Body Mass Index Body Surface Area 53.75 kg/m 2 2.65 m 2 Preferred Pharmacy Pharmacy Name Phone [phone] - [address], [address] [phone] Your Updated Medication List  
  
   
This list is accurate as of: 8/9/17  4:49 PM.  Always use your most recent med list.  
  
  
  
  
 aspirin delayed-release 81 mg tablet Take  by mouth daily. cholecalciferol 1,000 unit Cap Commonly known as:  VITAMIN D3 Take  by mouth. COZAAR 100 mg tablet Generic drug:  losartan Take 100 mg by mouth nightly. CRESTOR 10 mg tablet Generic drug:  rosuvastatin Take 10 mg by mouth nightly. NORVASC 10 mg tablet Generic drug:  amLODIPine Take  by mouth nightly. We Performed the Following AMB POC EKG ROUTINE W/ 12 LEADS, INTER & REP [12453 CPT(R)] To-Do List   
 08/10/2017 2:30 PM  
  Appointment with Lesli Wilkins at [facility]  [facility] ([phone])  
  
 09/18/2017 1:30 PM  
  Appointment with Radha Tena RD at [address] [facility] Introducing [facility]! [employer] introduces healthfinch patient portal. Now you can access parts of your medical record, email your doctor's office, and request medication refills online. 1. In your internet browser, go to [email or web address] 2. Click on the First Time User? Click Here link in the Sign In box. You will see the New Member Sign Up page. 3. Enter your healthfinch Access Code exactly as it appears below. You will not need to use this code after youve completed the sign-up process. If you do not sign up before the expiration date, you must request a new code. · healthfinch Access Code: [facility] Expires: 10/9/2017  3:34 PM 
 
4. Enter the last four digits of your Social Security Number (xxxx) and Date of Birth (mm/dd/yyyy) as indicated and click Submit. You will be taken to the next sign-up page. 5. Create a healthfinch ID. This will be your healthfinch login ID and cannot be changed, so think of one that is secure and easy to remember. 6. Create a healthfinch password. You can change your password at any time. 7. Enter your Password Reset Question and Answer. This can be used at a later time if you forget your password. 8. Enter your e-mail address. You will receive e-mail notification when new information is available in [address] 9. Click Sign Up. You can now view and download portions of your medical record. 10. Click the Download Summary menu link to download a portable copy of your medical information. If you have questions, please visit the Frequently Asked Questions section of the ironSourcet website. Remember, [employer] is NOT to be used for urgent needs. For medical emergencies, dial 911. Now available from your iPhone and Android! Please provide this summary of care documentation to your next provider. Your primary care clinician is listed as Susannah Rothman. If you have any questions after today's visit, please call [phone].

## 2017-08-09 NOTE — PROGRESS NOTES
1. Have you been to the ER, urgent care clinic since your last visit? Hospitalized since your last visit? no  2. Have you seen or consulted any other health care providers outside of the [address] [place] [address] since your last visit? Include any pap smears or colon screening.   Ref by PCP

## 2017-08-09 NOTE — PROGRESS NOTES
HISTORY OF PRESENT ILLNESS  Yasmine Almendarez is a 79 y.o. female. HPI    Patient presents for annual office visit. She was referred here for evaluation of dyspnea on exertion and an abnormal echocardiogram.  Patient has a history of long-standing hypertension, dyslipidemia, and morbid obesity. She states she underwent a cardiac catheterization while she was living in [place] around 2003 primarily because of her risk factors. She was found to have normal coronary anatomy according to the patient. She does complain of chronic exertional dyspnea which has been present for several years if not longer. This has not changed. She denies any chest pain or pressure. No leg swelling, no orthopnea, no PND. She recently underwent an echocardiogram through her PCPs office where she was on a preserved LV systolic function, and mild concentric LVH, no valvular heart disease. Past Medical History:   Diagnosis Date    Burning with urination     Diabetes ([address]     prediabetes    Dyslipidemia     Essential hypertension     H/O cardiac catheterization 2003    Normal coronary anatomy    H/O echocardiogram 07/2017    EF 65%, mild concentric LVH, no significant valvular heart disease    Ill-defined condition     High Cholesterol     Current Outpatient Prescriptions   Medication Sig Dispense Refill    losartan (COZAAR) 100 mg tablet Take 100 mg by mouth nightly.  amLODIPine (NORVASC) 10 mg tablet Take  by mouth nightly.  rosuvastatin (CRESTOR) 10 mg tablet Take 10 mg by mouth nightly.  aspirin delayed-release 81 mg tablet Take  by mouth daily.  Cholecalciferol, Vitamin D3, 1,000 unit cap Take  by mouth.        No Known Allergies     Social History   Substance Use Topics    Smoking status: Never Smoker    Smokeless tobacco: Never Used    Alcohol use No     Family History   Problem Relation Age of Onset    Stroke Mother     Heart Attack Father          Review of Systems   Constitutional: Negative for chills, fever and weight loss. HENT: Negative for nosebleeds. Eyes: Negative for blurred vision and double vision. Respiratory: Positive for shortness of breath. Negative for cough and wheezing. Cardiovascular: Negative for chest pain, palpitations, orthopnea, claudication, leg swelling and PND. Gastrointestinal: Negative for abdominal pain, heartburn, nausea and vomiting. Genitourinary: Negative for dysuria and hematuria. Musculoskeletal: Negative for falls and myalgias. Skin: Negative for rash. Neurological: Negative for dizziness, focal weakness and headaches. Endo/Heme/Allergies: Does not bruise/bleed easily. Psychiatric/Behavioral: Negative for substance abuse. Visit Vitals    /82 (BP 1 Location: Right arm, BP Patient Position: Sitting)    Pulse 74    Ht 5' 6\" (1.676 m)    Wt 151 kg (333 lb)    SpO2 97%    BMI 53.75 kg/m2       Physical Exam   Constitutional: She is oriented to person, place, and time. She appears well-developed and well-nourished. HENT:   Head: Normocephalic and atraumatic. Eyes: Conjunctivae are normal.   Neck: Neck supple. No JVD present. Carotid bruit is not present. Cardiovascular: Normal rate, regular rhythm, S1 normal, S2 normal and normal pulses. Exam reveals distant heart sounds. Exam reveals no gallop. No murmur heard. Pulmonary/Chest: Effort normal and breath sounds normal. She has no wheezes. She has no rales. Abdominal: Soft. Bowel sounds are normal. There is no tenderness. Musculoskeletal: She exhibits no edema, tenderness or deformity. Neurological: She is alert and oriented to person, place, and time. Skin: Skin is warm and dry. Psychiatric: She has a normal mood and affect. Her behavior is normal. Thought content normal.     EKG: Normal sinus rhythm, voltage criteria for LVH, leftward axis, nonspecific lateral T-wave abnormality likely related to repolarization changes.   Compared to the EKG from January 2016, the EKG changes are somewhat more pronounced. ASSESSMENT and PLAN  Encounter Diagnoses   Name Primary?  JESUS (dyspnea on exertion) Yes    Hypertensive heart disease without heart failure     Essential hypertension     Dyslipidemia     Morbid obesity, unspecified obesity type (HCC)      Dyspnea on exertion. I suspect this is more likely related to her body habitus and deconditioning rather than heart failure or angina. Her symptoms have not changed over the past few years. At this point I would just continue her current medical regimen. Hypertensive heart disease without heart failure. Patient underwent an echocardiogram recently which showed preserved LV systolic function with mild concentric LVH. Patient's blood pressure is mildly elevated today in the office. She states is usually better controlled. I have asked her to start checking this regularly. If it remains elevated, I would consider adding a thiazide diuretic to her medical regimen. Dyslipidemia. Patient has been treated with Crestor 10 mg daily. This is followed by her PCP. Morbid obesity. patient was counseled on the importance of weight loss with lifestyle modification. Patient can follow-up annually, sooner if needed.

## 2017-08-10 ENCOUNTER — HOSPITAL ENCOUNTER (OUTPATIENT)
Dept: PHYSICAL THERAPY | Age: 67
Discharge: HOME | End: 2017-08-10
Payer: MEDICARE

## 2017-08-10 PROCEDURE — 97110 THERAPEUTIC EXERCISES: CPT

## 2017-08-10 PROCEDURE — 97162 PT EVAL MOD COMPLEX 30 MIN: CPT

## 2017-08-10 PROCEDURE — G8979 MOBILITY GOAL STATUS: HCPCS

## 2017-08-10 PROCEDURE — G8978 MOBILITY CURRENT STATUS: HCPCS

## 2017-08-10 NOTE — PROGRESS NOTES
PT DAILY TREATMENT NOTE - [facility]     Patient Name: Janine Collet  Date:8/10/2017  : 1950  [x]  Patient  Verified  Payor: Manoj Art / Plan: [place] MEDICARE PART A & B / Product Type: Medicare /    In time:2:32  Out time:3:10  Total Treatment Time (min): 38  Total Timed Codes (min): 15  1:1 Treatment Time ( only): 38   Visit #: 1 of 8    Treatment Area: Low back pain [M54.5]    SUBJECTIVE  Pain Level (0-10 scale): 6  Any medication changes, allergies to medications, adverse drug reactions, diagnosis change, or new procedure performed?: [x] No    [] Yes (see summary sheet for update)  Subjective functional status/changes:   [] No changes reported  Pt reports central LBP mostly with sit<>stand transfer and bed mobility    OBJECTIVE      23 min [x]Eval                  []Re-Eval       15 min Therapeutic Exercise:  [] See flow sheet :   Rationale: increase ROM and increase proprioception to improve the patients ability to perform transfers and ADLs          With   [] TE   [] TA   [] neuro   [] other: Patient Education: [x] Review HEP    [] Progressed/Changed HEP based on:   [] positioning   [] body mechanics   [] transfers   [] heat/ice application    [] other:      Other Objective/Functional Measures: (+) TTP with sacral compression  (+) JAYA test    (+) [place]'s     Pain Level (0-10 scale) post treatment: 6    ASSESSMENT/Changes in Function: see POC    Patient will continue to benefit from skilled PT services to modify and progress therapeutic interventions, address functional mobility deficits, address ROM deficits, address strength deficits, analyze and address soft tissue restrictions, analyze and cue movement patterns and analyze and modify body mechanics/ergonomics to attain remaining goals. []  See Plan of Care  []  See progress note/recertification  []  See Discharge Summary         Progress towards goals / Updated goals:  Short Term Goals: To be accomplished in 2 weeks:  1.  Pt will be I and compliant with HEP to promote self management of condition. 2. Pt will demonstrate bilateral and unilateral glute squeeze for 5 seconds to improve core activation. Long Term Goals: To be accomplished in 4 weeks:  1. Pt will increase FOTO score to 67 to demonstrate improved quality of life. 2. Pt will demonstrate (-) Sandi's sign for improved ADL performance. 3. Pt will perform 10 sit<>stand transfers without back pain for improved functional mobility. 4. Pt will report ability to stand for 30 minutes without increased back pain to improve meal prep and household chore performance.     PLAN  []  Upgrade activities as tolerated     [x]  Continue plan of care  []  Update interventions per flow sheet       []  Discharge due to:_  []  Other:_      Cielo Dozier DPT 8/10/2017  3:32 PM    Future Appointments  Date Time Provider Christine Pablo   8/11/2017 9:00 AM [facility]   8/15/2017 10:30 AM Dell Easley, PT [facility]   8/17/2017 10:30 AM Jennifer Bello [facility]   8/22/2017 11:00 AM Dell Easley, PT [facility]   8/24/2017 12:00 PM Dell Easley, PT [facility]   8/29/2017 10:30 AM Su Caballero PTA [facility]   8/31/2017 10:30 AM Su Caballero PTA [facility]   9/18/2017 1:30 PM Jess Philippe RD [facility]   8/10/2018 11:20 AM Lu Chiu MD [address] [place] [address]

## 2017-08-10 NOTE — PROGRESS NOTES
[facility]  [address] [facility] [address]  [place], [address]  [phone] fax    Plan of Care/ Statement of Necessity for Physical Therapy Services    Patient name: Janine Collet Start of Care: 8/10/2017   Referral source: Lori Jones : 1950    Medical Diagnosis: Low back pain [M54.5]   Onset Date:2 weeks    Treatment Diagnosis: LBP   Prior Hospitalization: see medical history Provider#: [id number]   Medications: Verified on Patient summary List    Comorbidities: HTN, obesity   Prior Level of Function: Pt independent with ADLs and IADLs. Previous intermittent low back discomfort      The Plan of Care and following information is based on the information from the initial evaluation. Assessment/ key information: Pt is a 78 y/o F presenting with c/o localized central lumbar pain for the past 2 weeks. She does report having similar type pain in the past, but not of constant nature. (+) liberty's sign with poor core activation noted today. (+) TTP with sacral compression and L glute palpation, as well as (+) JAYA. Pt symptoms appear consistent with mechanical LBP with possible SIJ involvement to some degree. She would benefit from PT to address deficits in ROM, core strength, functional mobility and activity tolerance to improve overall functional status. Evaluation Complexity History MEDIUM  Complexity : 1-2 comorbidities / personal factors will impact the outcome/ POC ; Examination MEDIUM Complexity : 3 Standardized tests and measures addressing body structure, function, activity limitation and / or participation in recreation  ;Presentation MEDIUM Complexity : Evolving with changing characteristics  ; Clinical Decision Making MEDIUM Complexity : FOTO score of 26-74  Overall Complexity Rating: MEDIUM  Problem List: pain affecting function, decrease ROM, decrease strength, edema affecting function, decrease ADL/ functional abilitiies, decrease activity tolerance, decrease flexibility/ joint mobility and decrease transfer abilities   Treatment Plan may include any combination of the following: Therapeutic exercise, Therapeutic activities, Neuromuscular re-education, Physical agent/modality, Gait/balance training, Manual therapy, Patient education, Self Care training and Functional mobility training  Patient / Family readiness to learn indicated by: asking questions and trying to perform skills  Persons(s) to be included in education: patient (P)  Barriers to Learning/Limitations: yes;  other pain, obesity  Patient Goal (s): Karine Kan the pain  Patient Self Reported Health Status: good  Rehabilitation Potential: good    Short Term Goals: To be accomplished in 2 weeks:  1. Pt will be I and compliant with HEP to promote self management of condition. 2. Pt will demonstrate bilateral and unilateral glute squeeze for 5 seconds to improve core activation. Long Term Goals: To be accomplished in 4 weeks:  1. Pt will increase FOTO score to 67 to demonstrate improved quality of life. 2. Pt will demonstrate (-) Sandi's sign for improved ADL performance. 3. Pt will perform 10 sit<>stand transfers without back pain for improved functional mobility. 4. Pt will report ability to stand for 30 minutes without increased back pain to improve meal prep and household chore performance. Frequency / Duration: Patient to be seen 2 times per week for 4 weeks. Patient/ Caregiver education and instruction: Diagnosis, prognosis, activity modification and exercises   [x]  Plan of care has been reviewed with PTA    G-Codes (GP)  Mobility   Current  CK= 40-59%   Goal  CJ= 20-39%      The severity rating is based on clinical judgment and the FOTO score.     Certification Period: 8/10/17 to 10/5/2017  Kassi Ralph DPT 8/10/2017 3:14 PM    ________________________________________________________________________    I certify that the above Therapy Services are being furnished while the patient is under my care. I agree with the treatment plan and certify that this therapy is necessary.     [de-identified] Signature:____________________  Date:____________Time: _________    Please sign and return to [facility]  [address] [facility] [address]  [place], [address]  [phone] fax

## 2017-08-11 ENCOUNTER — HOSPITAL ENCOUNTER (OUTPATIENT)
Dept: PHYSICAL THERAPY | Age: 67
Discharge: HOME | End: 2017-08-11
Payer: MEDICARE

## 2017-08-11 PROCEDURE — 97112 NEUROMUSCULAR REEDUCATION: CPT

## 2017-08-11 PROCEDURE — 97110 THERAPEUTIC EXERCISES: CPT

## 2017-08-11 NOTE — PROGRESS NOTES
PT DAILY TREATMENT NOTE - [facility]     Patient Name: Wander Hood  Date:2017  : 1950  [x]  Patient  Verified  Payor: [place] MEDICARE / Plan: [place] MEDICARE PART A & B / Product Type: Medicare /    In time:9:04  Out time:9:52  Total Treatment Time (min): 48  Total Timed Codes (min): 48  1:1 Treatment Time ([address] only): 40   Visit #: 2 of 8    Treatment Area: Low back pain [M54.5]    SUBJECTIVE  Pain Level (0-10 scale): 4/10  Any medication changes, allergies to medications, adverse drug reactions, diagnosis change, or new procedure performed?: [x] No    [] Yes (see summary sheet for update)  Subjective functional status/changes:   [x] No changes reported    OBJECTIVE    40 min Therapeutic Exercise:  [x] See flow sheet :   Rationale: increase ROM and increase strength to improve the patients ability to perform ADL's.     8 min Neuromuscular Re-education:  [x]  See flow sheet :   Rationale: increase strength and increase proprioception  to improve the patients ability to perform functional activities. With   [x] TE   [] TA   [] neuro   [] other: Patient Education: [x] Review HEP    [] Progressed/Changed HEP based on:   [] positioning   [] body mechanics   [] transfers   [] heat/ice application    [] other:      Other Objective/Functional Measures: Initiated exercises per flow sheet. Good form with hip hinges, limited ROM. Pain Level (0-10 scale) post treatment: 3.5/10    ASSESSMENT/Changes in Function: First F/U visit. Patient will continue to benefit from skilled PT services to modify and progress therapeutic interventions, address functional mobility deficits, address ROM deficits, address strength deficits, analyze and cue movement patterns and analyze and modify body mechanics/ergonomics to attain remaining goals. [x]  See Plan of Care  []  See progress note/recertification  []  See Discharge Summary         Progress towards goals / Updated goals:  Short Term Goals:  To be accomplished in 2 weeks: 1. Pt will be I and compliant with HEP to promote self management of condition. - Has not attempted HEP, had IE yesterday. 8/11/2017  2. Pt will demonstrate bilateral and unilateral glute squeeze for 5 seconds to improve core activation. Long Term Goals: To be accomplished in 4 weeks:  1. Pt will increase FOTO score to 67 to demonstrate improved quality of life. 2. Pt will demonstrate (-) Sandi's sign for improved ADL performance. 3. Pt will perform 10 sit<>stand transfers without back pain for improved functional mobility. 4. Pt will report ability to stand for 30 minutes without increased back pain to improve meal prep and household chore performance.     PLAN  []  Upgrade activities as tolerated     [x]  Continue plan of care  []  Update interventions per flow sheet       []  Discharge due to:_  []  Other:_      Graciela Toscano PTA 8/11/2017  9:04 AM    Future Appointments  Date Time Provider Christine Pablo   8/15/2017 10:30 AM Ankush First, PT [facility]   8/17/2017 10:30 AM Romana Blair [facility]   8/22/2017 11:00 AM Ankush First, PT [facility]   8/24/2017 12:00 PM Ankush First, PT [facility]   8/29/2017 10:30 AM Graciela Toscano PTA [facility]   8/31/2017 10:30 AM Graciela Toscano PTA [facility]   9/18/2017 1:30 PM Joe Bravo RD [facility]   8/10/2018 11:20 AM Arabella Armenta MD [address] [place] [address]

## 2017-08-15 ENCOUNTER — HOSPITAL ENCOUNTER (OUTPATIENT)
Dept: PHYSICAL THERAPY | Age: 67
Discharge: HOME | End: 2017-08-15
Payer: MEDICARE

## 2017-08-15 PROCEDURE — 97110 THERAPEUTIC EXERCISES: CPT

## 2017-08-15 PROCEDURE — 97112 NEUROMUSCULAR REEDUCATION: CPT

## 2017-08-17 ENCOUNTER — HOSPITAL ENCOUNTER (OUTPATIENT)
Dept: PHYSICAL THERAPY | Age: 67
Discharge: HOME | End: 2017-08-17
Payer: MEDICARE

## 2017-08-17 PROCEDURE — 97112 NEUROMUSCULAR REEDUCATION: CPT

## 2017-08-17 PROCEDURE — 97110 THERAPEUTIC EXERCISES: CPT

## 2017-08-17 NOTE — PROGRESS NOTES
PT DAILY TREATMENT NOTE - [facility]    Patient Name: Aquiles Varela  Date:2017  : 1950  [x]  Patient  Verified  Payor: Sonia Carisa / Plan: [place] MEDICARE PART A & B / Product Type: Medicare /    In time:10:34  Out time:11:16  Total Treatment Time (min): 42  Total Timed Codes (min): 42  1:1 Treatment Time ( [address] only): 45   Visit #: 4 of 8    Treatment Area: Low back pain [M54.5]    SUBJECTIVE  Pain Level (0-10 scale): 4  Any medication changes, allergies to medications, adverse drug reactions, diagnosis change, or new procedure performed?: [x] No    [] Yes (see summary sheet for update)  Subjective functional status/changes:   [] No changes reported  \"It's getting better. Sitting and getting up is a little easier. \"    OBJECTIVE  34 min Therapeutic Exercise:  [x] See flow sheet :   Rationale: increase ROM, increase strength and improve coordination to improve the patients ability to increase ease with transfers    8 min Neuromuscular Re-education:  [x]  See flow sheet : glut re-ed activities to include glute bridge and glut sets   Rationale: increase strength, improve coordination, improve balance and increase proprioception  to improve the patients ability to increase ease with ADLs             With   [] TE   [] TA   [] neuro   [] other: Patient Education: [x] Review HEP    [] Progressed/Changed HEP based on:   [] positioning   [] body mechanics   [] transfers   [] heat/ice application    [] other:      Other Objective/Functional Measures:   Cues upright posture and TA engagement with hip 3 way  Cues to maintain hips flat on therapy mat with [place] stretch     Pain Level (0-10 scale) post treatment: 3-4/10    ASSESSMENT/Changes in Function: Patient is a pleasure to treat and puts forth good effort with session. Patient making good progress towards goals, reports that \"sitting and getting up is easier\". Patient also able to perform bilateral and unilateral glut squeeze. Will continue per POC.      Patient will continue to benefit from skilled PT services to modify and progress therapeutic interventions, address functional mobility deficits, address ROM deficits, address strength deficits, analyze and address soft tissue restrictions, analyze and cue movement patterns, analyze and modify body mechanics/ergonomics, assess and modify postural abnormalities and instruct in home and community integration to attain remaining goals. []  See Plan of Care  []  See progress note/recertification  []  See Discharge Summary         Progress towards goals / Updated goals:  Short Term Goals: To be accomplished in 2 weeks:  1. Pt will be I and compliant with HEP to promote self management of condition. - Has not attempted HEP, had IE yesterday. 8/11/2017  2. Pt will demonstrate bilateral and unilateral glute squeeze for 5 seconds to improve core activation. -met (8/17/2017)  Long Term Goals: To be accomplished in 4 weeks:  1. Pt will increase FOTO score to 67 to demonstrate improved quality of life. 2. Pt will demonstrate (-) [place]'s sign for improved ADL performance. 3. Pt will perform 10 sit<>stand transfers without back pain for improved functional mobility. 4. Pt will report ability to stand for 30 minutes without increased back pain to improve meal prep and household chore performance.     PLAN  []  Upgrade activities as tolerated     [x]  Continue plan of care  []  Update interventions per flow sheet       []  Discharge due to:_  []  Other:_      Christianobrittany Buerger 8/17/2017  10:33 AM    Future Appointments  Date Time Provider Christine Pablo   8/22/2017 11:00 AM [facility]   8/24/2017 12:00 PM [facility]   8/29/2017 10:30 AM [facility] Collet[facility]   8/31/2017 10:30 AM [facility], [facility]   9/18/2017 1:30 PM Melissa Ortega RD [facility]   8/10/2018 11:20 AM Kevin Louis MD [address] [place] [address]

## 2017-08-24 ENCOUNTER — HOSPITAL ENCOUNTER (OUTPATIENT)
Dept: PHYSICAL THERAPY | Age: 67
Discharge: HOME | End: 2017-08-24
Payer: MEDICARE

## 2017-08-24 PROCEDURE — 97110 THERAPEUTIC EXERCISES: CPT

## 2017-08-24 NOTE — PROGRESS NOTES
PT DAILY TREATMENT NOTE - [facility]     Patient Name: Elsie Foley  Date:2017  : 1950  [x]  Patient  Verified  Payor: Britt Ann / Plan: [place] MEDICARE PART A & B / Product Type: Medicare /    In time:1200  Out time:1239  Total Treatment Time (min): 39  Total Timed Codes (min): 39  1:1 Treatment Time ([address] only): 44  Visit #: 5 of 8    Treatment Area: Low back pain [M54.5]    SUBJECTIVE  Pain Level (0-10 scale): 5  Any medication changes, allergies to medications, adverse drug reactions, diagnosis change, or new procedure performed?: [x] No    [] Yes (see summary sheet for update)  Subjective functional status/changes:   [x] No changes reported      OBJECTIVE    39 min Therapeutic Exercise:  [] See flow sheet :   Rationale: increase ROM and increase strength to improve the patients ability to increase ease with daily activties          With   [] TE   [] TA   [] neuro   [] other: Patient Education: [x] Review HEP    [] Progressed/Changed HEP based on:   [] positioning   [] body mechanics   [] transfers   [] heat/ice application    [] other:      Other Objective/Functional Measures: FOTO 50     Pain Level (0-10 scale) post treatment: 4    ASSESSMENT/Changes in Function: FOTO improved by 7 points. Strength is slowly progressing as patient tolerate an increase in resistance with PREs. Patient will continue to benefit from skilled PT services to modify and progress therapeutic interventions, address functional mobility deficits, address strength deficits, analyze and address soft tissue restrictions and analyze and cue movement patterns to attain remaining goals. []  See Plan of Care  []  See progress note/recertification  []  See Discharge Summary         Progress towards goals / Updated goals:  Short Term Goals: To be accomplished in 2 weeks:  1. Pt will be I and compliant with HEP to promote self management of condition. - Has not attempted HEP, had IE yesterday. 2017  2.  Pt will demonstrate bilateral and unilateral glute squeeze for 5 seconds to improve core activation. -met (8/17/2017)  Long Term Goals: To be accomplished in 4 weeks:  1. Pt will increase FOTO score to 67 to demonstrate improved quality of life. 2. Pt will demonstrate (-) [place]'s sign for improved ADL performance. 3. Pt will perform 10 sit<>stand transfers without back pain for improved functional mobility. 4. Pt will report ability to stand for 30 minutes without increased back pain to improve meal prep and household chore performance.   PLAN  []  Upgrade activities as tolerated     [x]  Continue plan of care  []  Update interventions per flow sheet       []  Discharge due to:_  []  Other:_      Siomara Edwards, PT 8/24/2017  12:10 PM    Future Appointments  Date Time Provider Christine Pablo   8/29/2017 10:30 AM [facility]   8/31/2017 10:30 AM [facility]   9/18/2017 1:30 PM Florentino Novoa RD [facility]   8/10/2018 11:20 AM Afsaneh Becker  [facility]         \

## 2017-08-25 ENCOUNTER — APPOINTMENT (OUTPATIENT)
Dept: PHYSICAL THERAPY | Age: 67
End: 2017-08-25
Payer: MEDICARE

## 2017-08-29 ENCOUNTER — HOSPITAL ENCOUNTER (OUTPATIENT)
Dept: PHYSICAL THERAPY | Age: 67
Discharge: HOME | End: 2017-08-29
Payer: MEDICARE

## 2017-08-29 PROCEDURE — 97110 THERAPEUTIC EXERCISES: CPT

## 2017-08-29 PROCEDURE — 97112 NEUROMUSCULAR REEDUCATION: CPT

## 2017-08-29 NOTE — PROGRESS NOTES
PT DAILY TREATMENT NOTE - [facility]     Patient Name: Yasmine Almendarez  Date:2017  : 1950  [x]  Patient  Verified  Payor: Rex Membreno / Plan: [place] MEDICARE PART A & B / Product Type: Medicare /    In time:10:32  Out time:11:15  Total Treatment Time (min): 43  Total Timed Codes (min): 43  1:1 Treatment Time ([address] only): 41   Visit #: 6 of 8    Treatment Area: Low back pain [M54.5]    SUBJECTIVE  Pain Level (0-10 scale): 6/10  Any medication changes, allergies to medications, adverse drug reactions, diagnosis change, or new procedure performed?: [x] No    [] Yes (see summary sheet for update)  Subjective functional status/changes:   [] No changes reported    OBJECTIVE    33 min Therapeutic Exercise:  [x] See flow sheet :   Rationale: increase ROM and increase strength to improve the patients ability to perform ADL's. 10 min Neuromuscular Re-education:  [x]  See flow sheet :   Rationale: increase strength and increase proprioception  to improve the patients ability to perform functional activities. With   [x] TE   [] TA   [] neuro   [] other: Patient Education: [x] Review HEP    [] Progressed/Changed HEP based on:   [] positioning   [] body mechanics   [] transfers   [] heat/ice application    [] other:      Other Objective/Functional Measures: Added sit to stand 10x from low table. Pain Level (0-10 scale) post treatment: 4-5/10    ASSESSMENT/Changes in Function: Occasional pain increase with sit to stands, not every rep. Patient will continue to benefit from skilled PT services to modify and progress therapeutic interventions, address functional mobility deficits, address ROM deficits, address strength deficits, analyze and cue movement patterns and analyze and modify body mechanics/ergonomics to attain remaining goals.      [x]  See Plan of Care  []  See progress note/recertification  []  See Discharge Summary         Progress towards goals / Updated goals:  Short Term Goals: To be accomplished in 2 weeks:  1. Pt will be I and compliant with HEP to promote self management of condition. - Has not attempted HEP, had IE yesterday. 8/11/2017; Pt reports performing HEP at least once a day. 8/29/2017  2. Pt will demonstrate bilateral and unilateral glute squeeze for 5 seconds to improve core activation. -met (8/17/2017)  Long Term Goals: To be accomplished in 4 weeks:  1. Pt will increase FOTO score to 67 to demonstrate improved quality of life. - 50%. 8/24/2017  2. Pt will demonstrate (-) [place]'s sign for improved ADL performance. 3. Pt will perform 10 sit<>stand transfers without back pain for improved functional mobility. - Occasional pain increase with sit to stands, not every rep. 8/29/2017  4. Pt will report ability to stand for 30 minutes without increased back pain to improve meal prep and household chore performance.     PLAN  []  Upgrade activities as tolerated     [x]  Continue plan of care  []  Update interventions per flow sheet       []  Discharge due to:_  []  Other:_      Mattie Estrada PTA 8/29/2017  10:44 AM    Future Appointments  Date Time Provider Christine Pablo   8/31/2017 10:30 AM Mattie Estrada PTA [facility]   9/18/2017 1:30 PM Portia Rodrigues RD [facility]   8/10/2018 11:20 AM José Duenas MD [address] [place] [address]

## 2017-08-31 ENCOUNTER — HOSPITAL ENCOUNTER (OUTPATIENT)
Dept: PHYSICAL THERAPY | Age: 67
Discharge: HOME | End: 2017-08-31
Payer: MEDICARE

## 2017-08-31 PROCEDURE — 97112 NEUROMUSCULAR REEDUCATION: CPT

## 2017-08-31 PROCEDURE — 97110 THERAPEUTIC EXERCISES: CPT

## 2017-08-31 NOTE — PROGRESS NOTES
PT DAILY TREATMENT NOTE - [facility]     Patient Name: Edwin Sprain  Date:2017  : 1950  [x]  Patient  Verified  Payor: Aracely Guzman / Plan: [place] / Product Type: Medicare /    In time:10:35  Out time:11:19    Total Treatment Time (min): 44  Total Timed Codes (min): 44  1:1 Treatment Time ( [address] only): 45   Visit #: 7 of 8    Treatment Area: Low back pain [M54.5]    SUBJECTIVE  Pain Level (0-10 scale): 4/10  Any medication changes, allergies to medications, adverse drug reactions, diagnosis change, or new procedure performed?: [x] No    [] Yes (see summary sheet for update)  Subjective functional status/changes:   [] No changes reported  \"Back is sore. I did a lot of walking yesterday. \"    OBJECTIVE    34 min Therapeutic Exercise:  [x] See flow sheet :   Rationale: increase ROM and increase strength to improve the patients ability to perform ADL's. 10 min Neuromuscular Re-education:  [x]  See flow sheet :   Rationale: increase strength and increase proprioception  to improve the patients ability to perform functional activities. With   [x] TE   [] TA   [] neuro   [] other: Patient Education: [x] Review HEP    [] Progressed/Changed HEP based on:   [] positioning   [] body mechanics   [] transfers   [] heat/ice application    [] other:      Other Objective/Functional Measures: Negative [place]'s sign. Performed sit to stand 10x from table without UE assist.    Pain Level (0-10 scale) post treatment: 3/10    ASSESSMENT/Changes in Function: Pt reports increased activity tolerance, \"yesterday I went to a shooting range and then walked around a park for a while, legs feel a little sore. \"    Patient will continue to benefit from skilled PT services to modify and progress therapeutic interventions, address functional mobility deficits, address ROM deficits, address strength deficits, analyze and cue movement patterns and analyze and modify body mechanics/ergonomics to attain remaining goals. [x]  See Plan of Care  []  See progress note/recertification  []  See Discharge Summary         Progress towards goals / Updated goals:  Short Term Goals: To be accomplished in 2 weeks:  1. Pt will be I and compliant with HEP to promote self management of condition. - Has not attempted HEP, had IE yesterday. 8/11/2017; Pt reports performing HEP at least once a day. 8/29/2017  2. Pt will demonstrate bilateral and unilateral glute squeeze for 5 seconds to improve core activation. -met (8/17/2017)  Long Term Goals: To be accomplished in 4 weeks:  1. Pt will increase FOTO score to 67 to demonstrate improved quality of life. - 50%. 8/24/2017  2. Pt will demonstrate (-) [place]'s sign for improved ADL performance. - (-) [place] sign. Sit to stand 10x without UE assist. 8/31/2017  3. Pt will perform 10 sit<>stand transfers without back pain for improved functional mobility. - Occasional pain increase with sit to stands, not every rep. 8/29/2017  4. Pt will report ability to stand for 30 minutes without increased back pain to improve meal prep and household chore performance.     PLAN  []  Upgrade activities as tolerated     [x]  Continue plan of care  []  Update interventions per flow sheet       []  Discharge due to:_  []  Other:_      Moi Huang PTA 8/31/2017  10:47 AM    Future Appointments  Date Time Provider Christine Pablo   9/18/2017 1:30 PM Emmanuelle Uriostegui RD [facility]   8/10/2018 11:20 AM Roman Cancino MD [address] [place] [address]

## 2017-09-11 ENCOUNTER — HOSPITAL ENCOUNTER (OUTPATIENT)
Dept: PHYSICAL THERAPY | Age: 67
Discharge: HOME | End: 2017-09-11
Payer: MEDICARE

## 2017-09-11 PROCEDURE — G8980 MOBILITY D/C STATUS: HCPCS

## 2017-09-11 PROCEDURE — 97110 THERAPEUTIC EXERCISES: CPT

## 2017-09-11 PROCEDURE — 97112 NEUROMUSCULAR REEDUCATION: CPT

## 2017-09-11 PROCEDURE — G8979 MOBILITY GOAL STATUS: HCPCS

## 2017-09-11 NOTE — PROGRESS NOTES
PT DAILY TREATMENT NOTE - [facility]     Patient Name: Daryn Burdick  Date:2017  : 1950  [x]  Patient  Verified  Payor: [place] MEDICARE / Plan: [place] MEDICARE PART A & B / Product Type: Medicare /    In time:10:00  Out time:10:43   Total Treatment Time (min): 43  Total Timed Codes (min): 43  1:1 Treatment Time ( [address] only): 37  Visit #: 8 of 8    Treatment Area: Low back pain [M54.5]    SUBJECTIVE  Pain Level (0-10 scale): 3/10  Any medication changes, allergies to medications, adverse drug reactions, diagnosis change, or new procedure performed?: [x] No    [] Yes (see summary sheet for update)  Subjective functional status/changes:   [] No changes reported  \"I feel pretty good today\"    OBJECTIVE    33 min Therapeutic Exercise:  [x] See flow sheet :   Rationale: increase ROM and increase strength to improve the patients ability to perform ADL's. 10 min Neuromuscular Re-education:  [x]  See flow sheet :   Rationale: increase strength and increase proprioception  to improve the patients ability to perform functional activities. With   [x] TE   [] TA   [] neuro   [] other: Patient Education: [x] Review HEP    [] Progressed/Changed HEP based on:   [] positioning   [] body mechanics   [] transfers   [] heat/ice application    [] other:      Other Objective/Functional Measures: FOTO:    Pain Level (0-10 scale) post treatment: 0/10    ASSESSMENT/Changes in Function: pt has progressed with PT but still does have pain. She plans to continue with HEP. []  See Plan of Care  []  See progress note/recertification  [x]  See Discharge Summary         Progress towards goals / Updated goals:  Short Term Goals: To be accomplished in 2 weeks:  1. Pt will be I and compliant with HEP to promote self management of condition. - MET  2. Pt will demonstrate bilateral and unilateral glute squeeze for 5 seconds to improve core activation. -met (2017)  Long Term Goals: To be accomplished in 4 weeks:  1.  Pt will increase FOTO score to 67 to demonstrate improved quality of life. - 52%. 9/11/2017  2. Pt will demonstrate (-) [place]'s sign for improved ADL performance. - MET (-) [place] sign. Sit to stand 10x without UE assist. 8/31/2017  3. Pt will perform 10 sit<>stand transfers without back pain for improved functional mobility. - Occasional pain increase with sit to stands, not every rep. 8/29/2017  4. Pt will report ability to stand for 30 minutes without increased back pain to improve meal prep and household chore performance. - Not MET 9-11-17     PLAN  []  Upgrade activities as tolerated     []  Continue plan of care  []  Update interventions per flow sheet       [x]  Discharge due to:_  []  Other:_      Derick Mei, PT 9/11/2017  10:07 AM    Future Appointments  Date Time Provider Christine Pablo   9/18/2017 1:30 PM Page Code, RD [facility]   8/10/2018 11:20 AM Aliyah Mendoza MD [address] [place] [address]

## 2017-09-11 NOTE — PROGRESS NOTES
[facility]  [address] [facility] [address]  [place], [address]  [phone] fax    Physical Therapy Discharge Summary    Patient name: Sea Rodríguez Start of Care: 8/10/2017   Referral source: Lori Urbano : 1950                         Medical Diagnosis: Low back pain [M54.5] Onset Date:2 weeks                         Treatment Diagnosis: LBP   Prior Hospitalization: see medical history Provider#: [id number]   Medications: Verified on Patient summary List    Comorbidities: HTN, obesity   Prior Level of Function: Pt independent with ADLs and IADLs. Previous intermittent low back discomfort        Visits from Start of Care: 8    Missed Visits:        Summary of Care:  Progress towards goals :  Short Term Goals: To be accomplished in 2 weeks:  1. Pt will be I and compliant with HEP to promote self management of condition. - MET  2. Pt will demonstrate bilateral and unilateral glute squeeze for 5 seconds to improve core activation. -met (2017)  Long Term Goals: To be accomplished in 4 weeks:  1. Pt will increase FOTO score to 67 to demonstrate improved quality of life. - 52%. 2017  2. Pt will demonstrate (-) [place]'s sign for improved ADL performance. - MET (-) [place] sign. Sit to stand 10x without UE assist. 2017  3. Pt will perform 10 sit<>stand transfers without back pain for improved functional mobility. - Occasional pain increase with sit to stands, not every rep. 2017  4. Pt will report ability to stand for 30 minutes without increased back pain to improve meal prep and household chore performance. - Not MET 17     G-Codes (GP)  Mobility   [id number] Goal  CJ= 20-39%  D/C  CK= 40-59%      The severity rating is based on clinical judgment and the FOTO score.     ASSESSMENT/RECOMMENDATIONS:  [x]Discontinue therapy: [x]Patient has reached or is progressing toward set goals      []Patient is non-compliant or has abdicated      []Due to lack of appreciable progress towards set [address], PT 9/11/2017 10:31 AM

## 2017-09-18 ENCOUNTER — HOSPITAL ENCOUNTER (OUTPATIENT)
Dept: NUTRITION | Age: 67
Discharge: HOME | End: 2017-09-18
Payer: MEDICARE

## 2017-09-18 PROCEDURE — 97802 MEDICAL NUTRITION INDIV IN: CPT

## 2017-09-18 NOTE — PROGRESS NOTES
[address]  [address], [address] - Phone: [phone]     Nutrition Assessment - Medical Nutrition Therapy   Outpatient  Initial Evaluation         Patient Name: Jada Esparza : 1950   Treatment Diagnosis: Diabetes Onset Date:    Ref[facility] Source: [facility] 2017     Ht:  65.5 in  cm Wt:  337.6 lb  kg   BMI: 55.3  BMR male    BMR female      Diagnosis:         Medications of Nutritional Significance:        Subjective/Assessment: Pt is a 80 yo female seeking education for Diabetes and BS control. Pt is retired and lives alone, she was her brothers care taker which provided her structure, but he passed so she has not routine. She has been eating out a lot lately, her A! C is 5.5 (she was on metformin, but Dr, stopped because A1C went from >6 to 5.5) Pt was eating 5 times per day and balancing meals as well as exercising, but she has stopped everything. Discussed getting back to this and established a meal timeline to allow structure into lifestyle. Also, discussed pt preparing meals at home and getting back into an exercise routine.  -Educated pt on the pathophysiology of Type II Diabetes and insulin resistance and the rationale for dietary modification and increased activity. Discussed meal timing (follow a timeline), composition, and portion control. Discussed the plate method and how to better balance meals and snacks. Discussed eating with 1-2 hours of waking, going no longer than 5 hours without eating, but no closer than 3 hours, and stopping eating 2 hours before bed. Discussed the importance of drinking water and Sugar Free beverages only. Complete at least 120 mins of physical activity each week, divided as schedule permits. Pt expressed comprehension, high motivation, and compliance is expected.        Current Eating Patterns:  B: skipped  L: plum  D: ahumada kong buffet (not typical)  Pt drinks 3 bottles per day which hydrates her and only drinks juice, tea, or lemonade occationally. Handouts/  Information Provided: [x]  Carbohydrates  [x]  Protein  []  Fiber  [x]  Serving Sizes  [x]  Meal and Snack Ideas  []  Food Journals [x]  Diabetes  []  Cholesterol  []  Sodium  [x]  Gen Nutr Guidelines  []  SBGM Guidelines  [x]  Others: Veggies   Estimate Needs:   Calories:  1900 Protein: 171 Carbs: 162 Fat: 63   Kcal/day  g/day  g/day  g/day         percent: 36 percent: 34 percent: 30     Nutritional Goal - To promote lifestyle changes to result in:    [x]  weight loss  [x]  Improved diabetic control  []  Decreased cholesterol levels  []  Decreased blood pressure  []  weight maintenance []  Preventing any interactions associated with food allergies  []  Adequate weight gain toward goal weight  []  Other:          Recommendations: 1. Follow consistent, Appropriate meal timing; follow a structured timeline  2. Focus on good protein sources, Never eat carbs alone, always pair them with protein,   Carb Limits: 17891 gm @ meals, 10-15 gm @ snacks  3. Use the plate method for portion contol and balance  4. Exercise 3 times per week for 15-20 min and increase as endurance increases.       Information Reviewed with: Patient   Potential Barriers to Learning: None     Dietitian Signature: Hali Louise MA RD Date: 9/18/2017   Follow-up: 1 Nov @ 1000 Time: 1:37 PM

## 2017-11-01 ENCOUNTER — APPOINTMENT (OUTPATIENT)
Dept: NUTRITION | Age: 67
End: 2017-11-01

## 2018-06-05 ENCOUNTER — HOSPITAL ENCOUNTER (OUTPATIENT)
Dept: LAB | Age: 68
Discharge: HOME | End: 2018-06-05
Payer: MEDICARE

## 2018-06-05 LAB
ALBUMIN SERPL-MCNC: 3.4 G/DL (ref 3.4–5)
ALBUMIN/GLOB SERPL: 0.9 {RATIO} (ref 0.8–1.7)
ALP SERPL-CCNC: 70 U/L (ref 45–117)
ALT SERPL-CCNC: 41 U/L (ref 13–56)
ANION GAP SERPL CALC-SCNC: 8 MMOL/L (ref 3–18)
AST SERPL-CCNC: 39 U/L (ref 15–37)
BASOPHILS # BLD: 0 K/UL (ref 0–0.1)
BASOPHILS NFR BLD: 0 % (ref 0–2)
BILIRUB DIRECT SERPL-MCNC: 0.2 MG/DL (ref 0–0.2)
BILIRUB SERPL-MCNC: 0.6 MG/DL (ref 0.2–1)
BUN SERPL-MCNC: 24 MG/DL (ref 7–18)
BUN/CREAT SERPL: 21 (ref 12–20)
CALCIUM SERPL-MCNC: 9.9 MG/DL (ref 8.5–10.1)
CHLORIDE SERPL-SCNC: 105 MMOL/L (ref 100–108)
CO2 SERPL-SCNC: 29 MMOL/L (ref 21–32)
CREAT SERPL-MCNC: 1.12 MG/DL (ref 0.6–1.3)
DIFFERENTIAL METHOD BLD: NORMAL
EOSINOPHIL # BLD: 0.1 K/UL (ref 0–0.4)
EOSINOPHIL NFR BLD: 2 % (ref 0–5)
ERYTHROCYTE [DISTWIDTH] IN BLOOD BY AUTOMATED COUNT: 13.5 % (ref 11.6–14.5)
GLOBULIN SER CALC-MCNC: 3.6 G/DL (ref 2–4)
GLUCOSE SERPL-MCNC: 94 MG/DL (ref 74–99)
HCT VFR BLD AUTO: 41.5 % (ref 35–45)
HGB BLD-MCNC: 14.1 G/DL (ref 12–16)
LYMPHOCYTES # BLD: 2.2 K/UL (ref 0.9–3.6)
LYMPHOCYTES NFR BLD: 34 % (ref 21–52)
MCH RBC QN AUTO: 29.2 PG (ref 24–34)
MCHC RBC AUTO-ENTMCNC: 34 G/DL (ref 31–37)
MCV RBC AUTO: 85.9 FL (ref 74–97)
MONOCYTES # BLD: 0.5 K/UL (ref 0.05–1.2)
MONOCYTES NFR BLD: 7 % (ref 3–10)
NEUTS SEG # BLD: 3.7 K/UL (ref 1.8–8)
NEUTS SEG NFR BLD: 57 % (ref 40–73)
PLATELET # BLD AUTO: 183 K/UL (ref 135–420)
PMV BLD AUTO: 11.5 FL (ref 9.2–11.8)
POTASSIUM SERPL-SCNC: 4.2 MMOL/L (ref 3.5–5.5)
PROT SERPL-MCNC: 7 G/DL (ref 6.4–8.2)
RBC # BLD AUTO: 4.83 M/UL (ref 4.2–5.3)
SODIUM SERPL-SCNC: 142 MMOL/L (ref 136–145)
WBC # BLD AUTO: 6.5 K/UL (ref 4.6–13.2)

## 2018-06-05 PROCEDURE — 85025 COMPLETE CBC W/AUTO DIFF WBC: CPT | Performed by: PODIATRIST

## 2018-06-05 PROCEDURE — 80053 COMPREHEN METABOLIC PANEL: CPT | Performed by: PODIATRIST

## 2018-06-05 PROCEDURE — 82248 BILIRUBIN DIRECT: CPT | Performed by: PODIATRIST

## 2018-06-05 PROCEDURE — 36415 COLL VENOUS BLD VENIPUNCTURE: CPT | Performed by: PODIATRIST

## 2018-07-27 ENCOUNTER — OFFICE VISIT (OUTPATIENT)
Dept: CARDIOLOGY CLINIC | Age: 68
End: 2018-07-27

## 2018-07-27 VITALS
HEART RATE: 79 BPM | OXYGEN SATURATION: 94 % | DIASTOLIC BLOOD PRESSURE: 80 MMHG | HEIGHT: 66 IN | SYSTOLIC BLOOD PRESSURE: 126 MMHG | WEIGHT: 293 LBS | BODY MASS INDEX: 47.09 KG/M2

## 2018-07-27 DIAGNOSIS — E66.01 MORBID OBESITY, UNSPECIFIED OBESITY TYPE (HCC): ICD-10-CM

## 2018-07-27 DIAGNOSIS — R07.9 CHEST PAIN, UNSPECIFIED TYPE: Primary | ICD-10-CM

## 2018-07-27 DIAGNOSIS — R94.31 ABNORMAL EKG: ICD-10-CM

## 2018-07-27 DIAGNOSIS — I11.9 HYPERTENSIVE HEART DISEASE WITHOUT HEART FAILURE: ICD-10-CM

## 2018-07-27 DIAGNOSIS — E78.5 DYSLIPIDEMIA: ICD-10-CM

## 2018-07-27 NOTE — MR AVS SNAPSHOT
[address] [place] [address] [address] [place] [address] 
[phone] Patient: Jhonny Cobian MRN: [id number] PCU:[id number] Visit Information Date & Time Provider Department Dept. Phone Encounter #  
 7/27/2018  9:40 AM John Conteh MD [facility] [address] [id number] Your Appointments 8/2/2019  9:20 AM  
Follow Up with John Conteh MD  
[facility] ([address]) Appt Note: 1 year f/up [place] [id number]  
[phone] [address] Upcoming Health Maintenance Date Due Hepatitis C Screening 1950 DTaP/Tdap/Td series (1 - Tdap) 4/5/1971 FOBT Q 1 YEAR AGE 50-75 4/5/2000 ZOSTER VACCINE AGE 60> 2/5/2010 GLAUCOMA SCREENING Q2Y 4/5/2015 Bone Densitometry (Dexa) Screening 4/5/2015 Pneumococcal 65+ Low/Medium Risk (1 of 2 - PCV13) 4/5/2015 BREAST CANCER SCRN MAMMOGRAM 10/18/2018 Influenza Age 5 to Adult 8/1/2018 Allergies as of 7/27/2018  Review Complete On: 8/10/2017 By: Belkys Wagner No Known Allergies Current Immunizations  Never Reviewed No immunizations on file. Not reviewed this visit You Were Diagnosed With   
  
 Codes Comments Chest pain, unspecified type    -  Primary ICD-10-CM: R07.9 ICD-9-CM: 786.50 Abnormal EKG     ICD-10-CM: R94.31 
ICD-9-CM: 794.31 Vitals BP Pulse Height(growth percentile) Weight(growth percentile) SpO2 BMI  
 126/80 79 5' 6\" (1.676 m) 333 lb (151 kg) 94% 53.75 kg/m2 OB Status Smoking Status Hysterectomy Never Smoker Vitals History BMI and BSA Data Body Mass Index Body Surface Area 53.75 kg/m 2 2.65 m 2 Preferred Pharmacy Pharmacy Name Phone  [phone] [facility] [address] [facility] [address] [facility] [address] [facility] Your Updated Medication List  
  
   
This list is accurate as of 7/27/18 10:52 AM.  Always use your most recent med list.  
  
  
  
  
 aspirin delayed-release 81 mg tablet Take  by mouth daily. cholecalciferol 1,000 unit Cap Commonly known as:  VITAMIN D3 Take  by mouth. COZAAR 100 mg tablet Generic drug:  losartan Take 100 mg by mouth nightly. CRESTOR 10 mg tablet Generic drug:  rosuvastatin Take 10 mg by mouth nightly. NORVASC 10 mg tablet Generic drug:  amLODIPine Take 10 mg by mouth nightly. To-Do List   
 07/27/2018 NM Stress:  NUCLEAR CARDIAC STRESS TEST Introducing [facility] [place] [facility]! [facility] introduces [employer] patient portal. Now you can access parts of your medical record, email your doctor's office, and request medication refills online. 1. In your internet browser, go to [email or web address] 2. Click on the First Time User? Click Here link in the Sign In box. You will see the New Member Sign Up page. 3. Enter your [employer] Access Code exactly as it appears below. You will not need to use this code after youve completed the sign-up process. If you do not sign up before the expiration date, you must request a new code. · [employer] Access Code: [email or web address] Expires: 10/25/2018  9:47 AM 
 
4. Enter the last four digits of your Social Security Number (xxxx) and Date of Birth (mm/dd/yyyy) as indicated and click Submit. You will be taken to the next sign-up page. 5. Create a [employer] ID. This will be your [employer] login ID and cannot be changed, so think of one that is secure and easy to remember. 6. Create a [employer] password. You can change your password at any time. 7. Enter your Password Reset Question and Answer. This can be used at a later time if you forget your password. 8. Enter your e-mail address.  You will receive e-mail notification when new information is available in [employer]. 9. Click Sign Up. You can now view and download portions of your medical record. 10. Click the Download Summary menu link to download a portable copy of your medical information. If you have questions, please visit the Frequently Asked Questions section of the [employer] website. Remember, [employer] is NOT to be used for urgent needs. For medical emergencies, dial 911. Now available from your iPhone and Android! Please provide this summary of care documentation to your next provider. Your primary care clinician is listed as Violetta Kathleen. If you have any questions after today's visit, please call [phone].

## 2018-07-27 NOTE — PROGRESS NOTES
1. Have you been to the ER, urgent care clinic since your last visit? Hospitalized since your last visit? Yes, 7/13/18 at [facility] for chest pain     2. Have you seen or consulted any other health care providers outside of the [address] [place] [address] since your last visit? Include any pap smears or colon screening.  No

## 2018-07-27 NOTE — PROGRESS NOTES
HISTORY OF PRESENT ILLNESS  Michelle Horta is a 76 y.o. female. Hospital Follow Up   Associated symptoms include chest pain and shortness of breath. Pertinent negatives include no abdominal pain and no headaches. Chest Pain    Associated symptoms include shortness of breath. Pertinent negatives include no abdominal pain, no claudication, no cough, no dizziness, no fever, no headaches, no nausea, no orthopnea, no palpitations, no PND and no vomiting. Shortness of Breath   Associated symptoms include chest pain. Pertinent negatives include no fever, no headaches, no cough, no wheezing, no PND, no orthopnea, no vomiting, no abdominal pain, no rash, no leg swelling and no claudication. Patient presents for a follow-up office visit. She was initially referred here for evaluation of dyspnea on exertion and an abnormal echocardiogram.  Patient has a history of long-standing hypertension, dyslipidemia, and morbid obesity. She states she underwent a cardiac catheterization while she was living in [place] around 2003 primarily because of her risk factors. She was found to have normal coronary anatomy according to the patient. The patient was last seen in the office almost a year ago. Since last visit she has been doing very well up until 2 weeks ago when she had an episode of substernal chest tightness which did not radiate and occurred at rest.  She also noted worsening shortness of breath with the chest pain. He decided to seek medical attention at the [facility] emergency department, where she had a negative workup, but was instructed to either have a stress test or follow-up with her cardiologist.    Since the episode 2 weeks ago, she denies any recurrent chest pain or pressure. No significant change in her shortness of breath, no leg swelling, no orthopnea, no PND.     Past Medical History:   Diagnosis Date    Burning with urination     Diabetes ([facility] [address]     prediabetes    Dyslipidemia     Essential hypertension     H/O cardiac catheterization 2003    Normal coronary anatomy    H/O echocardiogram 07/2017    EF 65%, mild concentric LVH, no significant valvular heart disease    Ill-defined condition     High Cholesterol     Current Outpatient Prescriptions   Medication Sig Dispense Refill    losartan (COZAAR) 100 mg tablet Take 100 mg by mouth nightly.  amLODIPine (NORVASC) 10 mg tablet Take 10 mg by mouth nightly.  rosuvastatin (CRESTOR) 10 mg tablet Take 10 mg by mouth nightly.  aspirin delayed-release 81 mg tablet Take  by mouth daily.  Cholecalciferol, Vitamin D3, 1,000 unit cap Take  by mouth. No Known Allergies     Social History   Substance Use Topics    Smoking status: Never Smoker    Smokeless tobacco: Never Used    Alcohol use No     Family History   Problem Relation Age of Onset    Stroke Mother     Heart Attack Father          Review of Systems   Constitutional: Negative for chills, fever and weight loss. HENT: Negative for nosebleeds. Eyes: Negative for blurred vision and double vision. Respiratory: Positive for shortness of breath. Negative for cough and wheezing. Cardiovascular: Positive for chest pain. Negative for palpitations, orthopnea, claudication, leg swelling and PND. Gastrointestinal: Negative for abdominal pain, heartburn, nausea and vomiting. Genitourinary: Negative for dysuria and hematuria. Musculoskeletal: Negative for falls and myalgias. Skin: Negative for rash. Neurological: Negative for dizziness, focal weakness and headaches. Endo/Heme/Allergies: Does not bruise/bleed easily. Psychiatric/Behavioral: Negative for substance abuse. Visit Vitals    /80    Pulse 79    Ht 5' 6\" (1.676 m)    Wt 151 kg (333 lb)    SpO2 94%    BMI 53.75 kg/m2       Physical Exam   Constitutional: She is oriented to person, place, and time. She appears well-developed and well-nourished.    HENT:   Head: Normocephalic and atraumatic. Eyes: Conjunctivae are normal.   Neck: Neck supple. No JVD present. Carotid bruit is not present. Cardiovascular: Normal rate, regular rhythm, S1 normal, S2 normal and normal pulses. Exam reveals distant heart sounds. Exam reveals no gallop. No murmur heard. Pulmonary/Chest: Effort normal and breath sounds normal. She has no wheezes. She has no rales. Abdominal: Soft. Bowel sounds are normal. There is no tenderness. Musculoskeletal: She exhibits no edema, tenderness or deformity. Neurological: She is alert and oriented to person, place, and time. Skin: Skin is warm and dry. Psychiatric: She has a normal mood and affect. Her behavior is normal. Thought content normal.       ASSESSMENT and PLAN  Encounter Diagnoses   Name Primary?  Chest pain, unspecified type Yes    Abnormal EKG     Hypertensive heart disease without heart failure     Dyslipidemia     Morbid obesity, unspecified obesity type ([address]      Chest pain. Symptoms with both typical and atypical features for angina. She had negative cardiac biomarkers and her EKG was unchanged 2 weeks ago. However, due to her extensive risk factors, I recommended a pharmacologic nuclear stress is for further risk stratification. Hypertensive heart disease without heart failure. Patient's blood pressure appears to be well-controlled on her current regimen which includes amlodipine and losartan, both which I would continue. Dyslipidemia. Patient has been treated with Crestor 10 mg daily. This is followed by her PCP. Morbid obesity. patient was counseled on the importance of weight loss with lifestyle modification. As long as her stress test is unremarkable, the patient can follow-up annually, sooner if needed.

## 2018-10-02 ENCOUNTER — HOSPITAL ENCOUNTER (OUTPATIENT)
Dept: NON INVASIVE DIAGNOSTICS | Age: 68
Discharge: HOME | End: 2018-10-02
Attending: INTERNAL MEDICINE

## 2018-10-02 NOTE — PROGRESS NOTES
Patient wanted an alternative test to the nuclear stress test since she has difficulty breathing while laying down.   She says she will go back to Dr. Angélica Zuniga to ask for another test other than the nuclear stress test.      Patient armband removed and discarded into shred bin

## 2018-10-08 ENCOUNTER — TELEPHONE (OUTPATIENT)
Dept: CARDIOLOGY CLINIC | Age: 68
End: 2018-10-08

## 2018-10-08 DIAGNOSIS — R94.31 ABNORMAL EKG: ICD-10-CM

## 2018-10-08 DIAGNOSIS — R07.9 CHEST PAIN, UNSPECIFIED TYPE: Primary | ICD-10-CM

## 2018-10-08 NOTE — LETTER
11/5/2018 8:56 AM 
 
Ms. Bayron Powers [address] [id number] [place] [address] Dear Ms. Pilar Bell, We have been unable to reach you by phone to notify you of new orders you requested. Please call our office at [phone] and ask to speak with my nurse in order to explain these orders to you and advise you of any recommendations. Sincerely, Ab Crawford MD

## 2018-10-08 NOTE — TELEPHONE ENCOUNTER
Patient called in requesting a different test other then nuclear cardiac stress test patient states she is claustrophobic and can not go through with testing. Reviewed this information with Dr. Warden Burns. Verbal order and read back per Akash Remy MD  Stress echo for chest pain and abnormal ekg. Orders in system. Lm on vm for patient to give office a call to inform of new orders.

## 2018-10-26 ENCOUNTER — HOSPITAL ENCOUNTER (OUTPATIENT)
Dept: MAMMOGRAPHY | Age: 68
Discharge: HOME | End: 2018-10-26
Attending: FAMILY MEDICINE
Payer: MEDICARE

## 2018-10-26 ENCOUNTER — HOSPITAL ENCOUNTER (OUTPATIENT)
Dept: BONE DENSITY | Age: 68
Discharge: HOME | End: 2018-10-26
Attending: FAMILY MEDICINE
Payer: MEDICARE

## 2018-10-26 DIAGNOSIS — Z90.79: ICD-10-CM

## 2018-10-26 DIAGNOSIS — Z12.31 VISIT FOR SCREENING MAMMOGRAM: ICD-10-CM

## 2018-10-26 DIAGNOSIS — Z13.820 SCREENING FOR OSTEOPOROSIS: ICD-10-CM

## 2018-10-26 DIAGNOSIS — Z78.0 POSTMENOPAUSAL STATE: ICD-10-CM

## 2018-10-26 PROCEDURE — 77067 SCR MAMMO BI INCL CAD: CPT

## 2018-10-26 PROCEDURE — 77080 DXA BONE DENSITY AXIAL: CPT

## 2019-02-06 ENCOUNTER — HOSPITAL ENCOUNTER (OUTPATIENT)
Dept: PHYSICAL THERAPY | Age: 69
Discharge: HOME | End: 2019-02-06
Payer: MEDICARE

## 2019-02-06 PROCEDURE — 97162 PT EVAL MOD COMPLEX 30 MIN: CPT

## 2019-02-06 PROCEDURE — 97110 THERAPEUTIC EXERCISES: CPT

## 2019-02-06 NOTE — PROGRESS NOTES
PT DAILY TREATMENT NOTE 10-18    Patient Name: Mai Roper  Date:2019  : 1950  [x]  Patient  Verified  Payor: [place] MEDICARE / Plan: [place] MEDICARE PART A & B / Product Type: Medicare /    In time:2:08  Out time:2:52  Total Treatment Time (min): 42  Visit #: 1 of 8    Medicare/BCBS Only   Total Timed Codes (min): 10 1:1 Treatment Time:  10       Treatment Area: Pain in right leg [M79.604]  Pain in left leg [M79.605]  Iliotibial band syndrome, right leg [M76.31]    SUBJECTIVE  Pain Level (0-10 scale): 6/10  Any medication changes, allergies to medications, adverse drug reactions, diagnosis change, or new procedure performed?: [x] No    [] Yes (see summary sheet for update)  Subjective functional status/changes:   [] No changes reported  The patient states she has a chief complaint of right and left lateral hip pain, with the right being more bothersome than her left. OBJECTIVE  10 min Therapeutic Exercise:  [x] See flow sheet :   Rationale: increase ROM and increase strength to improve the patients ability to improve ADL ease. With   [] TE   [] TA   [] neuro   [] other: Patient Education: [x] Review HEP    [] Progressed/Changed HEP based on:   [] positioning   [] body mechanics   [] transfers   [] heat/ice application    [] other:      Other Objective/Functional Measures: See IE     Pain Level (0-10 scale) post treatment: 6/10    ASSESSMENT/Changes in Function: See POC. Patient will continue to benefit from skilled PT services to modify and progress therapeutic interventions, address functional mobility deficits, address ROM deficits, address strength deficits, analyze and address soft tissue restrictions, analyze and cue movement patterns, analyze and modify body mechanics/ergonomics, assess and modify postural abnormalities and instruct in home and community integration to attain remaining goals.      [x]  See Plan of Care  []  See progress note/recertification  []  See Discharge Summary Progress towards goals / Updated goals:  Short Term Goals: To be accomplished in 2 weeks:              1. The patient will demonstrate independence and compliant with HEP to maximize therapeutic benefit. 2. The patient will improve sit to stand from standard height without increase in hip pain to maximize ease of transfers. Long Term Goals: To be accomplished in 4 weeks:              1. The patient will improve FOTO score to 54 to maximize quality of life. 2. The patient will improve hip ABD to 4/5 MMT to maximize stability in stance. 3. The patient will improve negotiate stairs with a little bit of difficulty to maximize ease of stair negotiation. 4. The patient will demonstrate negative 90/90 HS flexibility test to improve ease of ambulation.       PLAN  []  Upgrade activities as tolerated     [x]  Continue plan of care  []  Update interventions per flow sheet       []  Discharge due to:_  []  Other:_      Kenan Arthur PT 2/6/2019  3:55 PM    Future Appointments   Date Time Provider Christine Pablo   2/11/2019  2:00 PM Cedric Jean PT [facility]   2/13/2019  2:00 PM Serg Black [facility]   2/18/2019  8:30 AM eCdric Jean PT [facility]   2/20/2019  8:30 AM Cedric Jean, PT [facility]   2/25/2019  9:30 AM Serg Black [facility]   2/27/2019  9:30 AM Cedric Jean, PT [facility]   3/5/2019 10:00 AM Cedric Jean PT [facility]   8/2/2019  9:20 AM Tracy Santana MD [address] [place] [address]

## 2019-02-06 NOTE — PROGRESS NOTES
[facility]  [address] [facility] [address]  [place], [address]  [phone] fax    Plan of Care/ Statement of Necessity for Physical Therapy Services    Patient name: Edwin Sprain Start of Care: 2019   Referral source: You Velez * : 1950    Medical Diagnosis: Pain in right leg [M79.604]  Pain in left leg [M79.605]  Iliotibial band syndrome, right leg [M76.31]  Payor: VA MEDICARE / Plan: VA MEDICARE PART A & B / Product Type: Medicare /  Onset Date: About a month ago    Treatment Diagnosis: bilateral hip pain   Prior Hospitalization: see medical history Provider#: [id number]   Medications: Verified on Patient summary List    Comorbidities: Diabetes, HTN, BMI, Incontinence   Prior Level of Function: The patient states that she had improved ease of standing, and transfers prior to onset. The Plan of Care and following information is based on the information from the initial evaluation. Assessment/ key information: The patient is a 76year old female with a chief complaint of B hip pain, with the right lateral hip being more bothersome than the left. She demonstrates point tenderness through vastus lateralis and right greater than left. The patient denies injections or imaging. She does have signs and symptoms consistent with ITB syndrome right more so than her left with associated impairments consisting of pain, decreased ROM, decreased flexibility, decreased strength, and limited ADL ease. The patient will benefit from skilled PT in order to address the above impairments. Evaluation Complexity History HIGH Complexity :3+ comorbidities / personal factors will impact the outcome/ POC ; Examination MEDIUM Complexity : 3 Standardized tests and measures addressing body structure, function, activity limitation and / or participation in recreation  ;Presentation MEDIUM Complexity : Evolving with changing characteristics  ; Clinical Decision Making MEDIUM Complexity : FOTO score of 26-74  Overall Complexity Rating: MEDIUM  Problem List: pain affecting function, decrease ROM, decrease strength, decrease ADL/ functional abilitiies, decrease activity tolerance and decrease flexibility/ joint mobility   Treatment Plan may include any combination of the following: Therapeutic exercise, Therapeutic activities, Neuromuscular re-education, Physical agent/modality, Manual therapy, Patient education and Functional mobility training  Patient / Family readiness to learn indicated by: asking questions, trying to perform skills and interest  Persons(s) to be included in education: patient (P)  Barriers to Learning/Limitations: None  Patient Goal (s): be pain free  Patient Self Reported Health Status: good  Rehabilitation Potential: good    Short Term Goals: To be accomplished in 2 weeks:   1. The patient will demonstrate independence and compliant with HEP to maximize therapeutic benefit. 2. The patient will improve sit to stand from standard height without increase in hip pain to maximize ease of transfers. Long Term Goals: To be accomplished in 4 weeks:   1. The patient will improve FOTO score to 54 to maximize quality of life. 2. The patient will improve hip ABD to 4/5 MMT to maximize stability in stance. 3. The patient will improve negotiate stairs with a little bit of difficulty to maximize ease of stair negotiation. 4. The patient will demonstrate negative 90/90 HS flexibility test to improve ease of ambulation. Frequency / Duration: Patient to be seen 2 times per week for 8 weeks.     Patient/ Caregiver education and instruction: Diagnosis, prognosis, self care, activity modification and exercises   [x]  Plan of care has been reviewed with PTA    Certification Period: 2/06/2019 - 4/07/2019  Dolly Oseguera, PT 2/6/2019 2:49 PM    ________________________________________________________________________    I certify that the above Therapy Services are being furnished while the patient is under my care. I agree with the treatment plan and certify that this therapy is necessary.     [de-identified] Signature:____________________  Date:____________Time: _________    Please sign and return to [facility]  [address] [facility] [address]  [place], [address]  [phone] fax

## 2019-02-11 ENCOUNTER — HOSPITAL ENCOUNTER (OUTPATIENT)
Dept: PHYSICAL THERAPY | Age: 69
Discharge: HOME | End: 2019-02-11
Payer: MEDICARE

## 2019-02-11 PROCEDURE — 97140 MANUAL THERAPY 1/> REGIONS: CPT

## 2019-02-11 PROCEDURE — 97110 THERAPEUTIC EXERCISES: CPT

## 2019-02-11 NOTE — PROGRESS NOTES
PT DAILY TREATMENT NOTE - [facility]     Patient Name: Sanam Hernandez  Date:2019  : 1950  [x]  Patient  Verified  Payor: Eden Arroyo / Plan: VA MEDICARE PART A & B / Product Type: Medicare /    In time:2:02  Out time:2:36  Total Treatment Time (min): 34  Total Timed Codes (min): 34  1:1 Treatment Time ( [address] only): 34   Visit #: 2 of 8    Treatment Area: Pain in right leg [M79.604]  Pain in left leg [M79.605]  Iliotibial band syndrome, right leg [M76.31]    SUBJECTIVE  Pain Level (0-10 scale): 5/10  Any medication changes, allergies to medications, adverse drug reactions, diagnosis change, or new procedure performed?: [x] No    [] Yes (see summary sheet for update)  Subjective functional status/changes:   [] No changes reported  The patient indicates she has not yet performed her HEP. She states she has no new complaints upon arrival.     OBJECTIVE  26 min Therapeutic Exercise:  [x] See flow sheet :   Rationale: increase ROM and increase strength to improve the patients ability to improve ADL ease. 8 min Manual Therapy:  Stick to right vastus lateralis   Rationale: decrease pain, increase ROM and increase tissue extensibility to improve ADL ease. With   [] TE   [] TA   [] neuro   [] other: Patient Education: [x] Review HEP    [] Progressed/Changed HEP based on:   [] positioning   [] body mechanics   [] transfers   [] heat/ice application    [] other:      Other Objective/Functional Measures:   Initiated there-ex without increase in pain. Fatigued with squats and sit to stands. Pain Level (0-10 scale) post treatment: 5/10    ASSESSMENT/Changes in Function: No increase in pain post session. Recommended patient initiate HEP. She left the clinic in no apparent distress.     Patient will continue to benefit from skilled PT services to modify and progress therapeutic interventions, address functional mobility deficits, address ROM deficits, address strength deficits, analyze and address soft tissue restrictions, analyze and cue movement patterns, analyze and modify body mechanics/ergonomics, assess and modify postural abnormalities and instruct in home and community integration to attain remaining goals. []  See Plan of Care  []  See progress note/recertification  []  See Discharge Summary         Progress towards goals / Updated goals:  Short Term Goals: To be accomplished in 2 weeks:              7. The patient will demonstrate independence and compliant with HEP to maximize therapeutic benefit. Not met 2/11/2019              2. The patient will improve sit to stand from standard height without increase in hip pain to maximize ease of transfers. Long Term Goals: To be accomplished in 4 weeks:              1. The patient will improve FOTO score to 54 to maximize quality of life.              2. The patient will improve hip ABD to 4/5 MMT to maximize stability in stance.              3. The patient will improve negotiate stairs with a little bit of difficulty to maximize ease of stair negotiation.              4. The patient will demonstrate negative 90/90 HS flexibility test to improve ease of ambulation.         PLAN  []  Upgrade activities as tolerated     [x]  Continue plan of care  []  Update interventions per flow sheet       []  Discharge due to:_  []  Other:_      Shirley Greenfield PT 2/11/2019  2:11 PM    Future Appointments   Date Time Provider Christine Pablo   2/13/2019  2:00 PM Franck Baron [facility]   2/18/2019  8:30 AM Derick Vazquez, PT [facility]   2/20/2019  8:30 AM Derick Vazquez, PT [facility]   2/25/2019  9:30 AM Franck Baron [facility]   2/27/2019  9:30 AM Derick Vazquez, PT [facility]   3/5/2019 10:00 AM Derick Vazquez, PT [facility]   8/2/2019  9:20 AM Stanley Sol MD [address] [place] [address]

## 2019-02-13 ENCOUNTER — HOSPITAL ENCOUNTER (OUTPATIENT)
Dept: PHYSICAL THERAPY | Age: 69
Discharge: HOME | End: 2019-02-13
Payer: MEDICARE

## 2019-02-13 PROCEDURE — 97140 MANUAL THERAPY 1/> REGIONS: CPT

## 2019-02-13 PROCEDURE — 97110 THERAPEUTIC EXERCISES: CPT

## 2019-02-13 NOTE — PROGRESS NOTES
PT DAILY TREATMENT NOTE - [facility]     Patient Name: Elver Willson  Date:2019  : 1950  [x]  Patient  Verified  Payor: Jony Khan / Plan: VA MEDICARE PART A & B / Product Type: Medicare /    In time:2:05pm  Out time:2:43pm  Total Treatment Time (min): 38  Total Timed Codes (min): 38  1:1 Treatment Time ( [address] only): 24   Visit #: 3 of 8    Treatment Area: Pain in right leg [M79.604]  Pain in left leg [M79.605]  Iliotibial band syndrome, right leg [M76.31]    SUBJECTIVE  Pain Level (0-10 scale): 4  Any medication changes, allergies to medications, adverse drug reactions, diagnosis change, or new procedure performed?: [x] No    [] Yes (see summary sheet for update)  Subjective functional status/changes:   [x] No changes reported    OBJECTIVE  30 min Therapeutic Exercise:  [x] See flow sheet :   Rationale: increase ROM and increase strength to improve the patients ability to improve ease of daily activity    8 min Manual Therapy:  Stick to right vastus lateralis   Rationale: decrease pain, increase ROM and increase tissue extensibility to improve ease of ambulatory activity. With   [] TE   [] TA   [] neuro   [] other: Patient Education: [x] Review HEP    [] Progressed/Changed HEP based on:   [] positioning   [] body mechanics   [] transfers   [] heat/ice application    [] other:      Other Objective/Functional Measures:      Pain Level (0-10 scale) post treatment: 6    ASSESSMENT/Changes in Function: Pt tolerated interventions well with no reports of increased pain during, but reports increased pain post treatment. She does demonstrate signs of functional hip weakness with noted trunk and hip drop compensatory movement with hip abduction in standing. Continue per POC with emphasis on hip strengthening.     Patient will continue to benefit from skilled PT services to modify and progress therapeutic interventions, address functional mobility deficits, address ROM deficits, address strength deficits, analyze and address soft tissue restrictions, analyze and cue movement patterns and instruct in home and community integration to attain remaining goals. []  See Plan of Care  []  See progress note/recertification  []  See Discharge Summary         Progress towards goals / Updated goals:  Short Term Goals: To be accomplished in 2 weeks:              6. The patient will demonstrate independence and compliant with HEP to maximize therapeutic benefit. Not met 2/11/2019              2. The patient will improve sit to stand from standard height without increase in hip pain to maximize ease of transfers. Long Term Goals: To be accomplished in 4 weeks:              1. The patient will improve FOTO score to 54 to maximize quality of life.              2. The patient will improve hip ABD to 4/5 MMT to maximize stability in stance.              3. The patient will improve negotiate stairs with a little bit of difficulty to maximize ease of stair negotiation.              4. The patient will demonstrate negative 90/90 HS flexibility test to improve ease of ambulation.     PLAN  []  Upgrade activities as tolerated     [x]  Continue plan of care  []  Update interventions per flow sheet       []  Discharge due to:_  []  Other:_      Vernel Goodpasture, PT, DPT, ATC 2/13/2019  2:16 PM    Future Appointments   Date Time Provider Christine Pablo   2/18/2019  8:30 AM Cande Brower PT [facility]   2/20/2019  8:30 AM Cande Brower, PT [facility]   2/25/2019  9:30 AM Paulie Garcia [facility]   2/27/2019  9:30 AM Cande Brower, PT [facility]   3/5/2019 10:00 AM Cande Brower, PT [facility]   8/2/2019  9:20 AM Herman Bejarano MD [address] [place] [address]

## 2019-02-18 ENCOUNTER — APPOINTMENT (OUTPATIENT)
Dept: PHYSICAL THERAPY | Age: 69
End: 2019-02-18
Payer: MEDICARE

## 2019-02-18 ENCOUNTER — HOSPITAL ENCOUNTER (OUTPATIENT)
Dept: PHYSICAL THERAPY | Age: 69
Discharge: HOME | End: 2019-02-18
Payer: MEDICARE

## 2019-02-18 PROCEDURE — 97140 MANUAL THERAPY 1/> REGIONS: CPT

## 2019-02-18 PROCEDURE — 97110 THERAPEUTIC EXERCISES: CPT

## 2019-02-18 PROCEDURE — 97112 NEUROMUSCULAR REEDUCATION: CPT

## 2019-02-18 NOTE — PROGRESS NOTES
PT DAILY TREATMENT NOTE 10-18    Patient Name: Pattie Medina  Date:2019  : 1950  [x]  Patient  Verified  Payor: VA MEDICARE / Plan: VA MEDICARE PART A & B / Product Type: Medicare /    In time:8:30  Out time:9:11  Total Treatment Time (min): 41  Visit #: 4 of 8    Medicare/BCBS Only   Total Timed Codes (min):  41 1:1 Treatment Time:  41       Treatment Area: Pain in right leg [M79.604]  Pain in left leg [M79.605]  Iliotibial band syndrome, right leg [M76.31]    SUBJECTIVE  Pain Level (0-10 scale): 6/10  Any medication changes, allergies to medications, adverse drug reactions, diagnosis change, or new procedure performed?: [x] No    [] Yes (see summary sheet for update)  Subjective functional status/changes:   [] No changes reported  The patient states that she has been feeling better, but is experiencing a little more pain today, and attributes this to the weather. The patient indicated she was able to do a little bit of her home exercises, but not daily. OBJECTIVE  23 min Therapeutic Exercise:  [x] See flow sheet :   Rationale: increase ROM and increase strength to improve the patients ability to improve ADL ease. 10 min Neuromuscular Re-education:  []  See flow sheet :   Rationale: increase ROM and increase strength  to improve the patients ability to improve ADL ease. 8 min Manual Therapy:  Stick to right vastus lateralis   Rationale: decrease pain, increase ROM and increase tissue extensibility to improve ADL ease. With   [] TE   [] TA   [] neuro   [] other: Patient Education: [x] Review HEP    [] Progressed/Changed HEP based on:   [] positioning   [] body mechanics   [] transfers   [] heat/ice application    [] other:      Other Objective/Functional Measures:   Partial compliance reported with HEP. Sit to stand: 55 cm 8x     Pain Level (0-10 scale) post treatment: 6/10    ASSESSMENT/Changes in Function: Partial compliance reported with HEP.  Sit to stands remain somewhat painful today, but able to progress them to 8x at 55cm. Continue to progress strength to maximize ease of standing and transfers. Patient will continue to benefit from skilled PT services to modify and progress therapeutic interventions, address functional mobility deficits, address ROM deficits, address strength deficits, analyze and address soft tissue restrictions, analyze and cue movement patterns, analyze and modify body mechanics/ergonomics, assess and modify postural abnormalities and instruct in home and community integration to attain remaining goals. []  See Plan of Care  []  See progress note/recertification  []  See Discharge Summary         Progress towards goals / Updated goals:  Short Term Goals: To be accomplished in 2 weeks:              0. The patient will demonstrate independence and compliant with HEP to maximize therapeutic benefit. Not met 2/11/2019; Not met, though reports partial compliance 2/18/2019              2. The patient will improve sit to stand from standard height without increase in hip pain to maximize ease of transfers. Progressed to 8 times at 55cm 2/18/2019  Long Term Goals: To be accomplished in 4 weeks:              1. The patient will improve FOTO score to 54 to maximize quality of life.              2. The patient will improve hip ABD to 4/5 MMT to maximize stability in stance.              3. The patient will improve negotiate stairs with a little bit of difficulty to maximize ease of stair negotiation.              4. The patient will demonstrate negative 90/90 HS flexibility test to improve ease of ambulation.     PLAN  []  Upgrade activities as tolerated     [x]  Continue plan of care  []  Update interventions per flow sheet       []  Discharge due to:_  []  Other:_      Avinash Jerry, PT 2/18/2019  8:39 AM    Future Appointments   Date Time Provider Christine Pablo   2/20/2019  8:30 AM Minda Messer, PT [facility]   2/25/2019  9:30 [facility]   2/27/2019  9:30 AM Salud Lazo, PT [facility]   3/5/2019 10:00 AM Salud Lazo, PT [facility]   8/2/2019  9:20 AM Jesus Ferrell MD [address] [place] [address]

## 2019-02-20 ENCOUNTER — APPOINTMENT (OUTPATIENT)
Dept: PHYSICAL THERAPY | Age: 69
End: 2019-02-20
Payer: MEDICARE

## 2019-02-25 ENCOUNTER — HOSPITAL ENCOUNTER (OUTPATIENT)
Dept: PHYSICAL THERAPY | Age: 69
Discharge: HOME | End: 2019-02-25
Payer: MEDICARE

## 2019-02-25 PROCEDURE — 97140 MANUAL THERAPY 1/> REGIONS: CPT

## 2019-02-25 PROCEDURE — 97110 THERAPEUTIC EXERCISES: CPT

## 2019-02-25 NOTE — PROGRESS NOTES
PT DAILY TREATMENT NOTE - [facility]     Patient Name: Pattie Medina  Date:2019  : 1950  [x]  Patient  Verified  Payor: VA MEDICARE / Plan: VA MEDICARE PART A & B / Product Type: Medicare /    In time:9:30am  Out time:10:08am  Total Treatment Time (min): 38  Total Timed Codes (min): 38  1:1 Treatment Time ( [address] only): 28   Visit #: 5 of 8    Treatment Area: Pain in right leg [M79.604]  Pain in left leg [M79.605]  Iliotibial band syndrome, right leg [M76.31]    SUBJECTIVE  Pain Level (0-10 scale): 3  Any medication changes, allergies to medications, adverse drug reactions, diagnosis change, or new procedure performed?: [x] No    [] Yes (see summary sheet for update)  Subjective functional status/changes:   [] No changes reported  \"I'm doing alright. \" Pt reports primarily right hip/thigh pain upon arrival today. OBJECTIVE  30 min Therapeutic Exercise:  [x] See flow sheet :   Rationale: increase ROM and increase strength to improve the patients ability to improve ease of daily activity. 8 min Manual Therapy:  Stick to right vastus lateralis   Rationale: decrease pain, increase ROM and increase tissue extensibility to improve ease of daily activity and reduce pain. With   [] TE   [] TA   [] neuro   [] other: Patient Education: [x] Review HEP    [] Progressed/Changed HEP based on:   [] positioning   [] body mechanics   [] transfers   [] heat/ice application    [] other:      Other Objective/Functional Measures: TTP proximal and distal vastus lateralis     Pain Level (0-10 scale) post treatment: 4    ASSESSMENT/Changes in Function: Pt reports some minimal increased right lateral thigh discomfort with WB exercise, but tolerated interventions well today. Continue per POC.     Patient will continue to benefit from skilled PT services to modify and progress therapeutic interventions, address functional mobility deficits, address ROM deficits, address strength deficits, analyze and address soft tissue restrictions, analyze and cue movement patterns and instruct in home and community integration to attain remaining goals. []  See Plan of Care  []  See progress note/recertification  []  See Discharge Summary         Progress towards goals / Updated goals:  Short Term Goals: To be accomplished in 2 weeks:              3. The patient will demonstrate independence and compliant with HEP to maximize therapeutic benefit. Not met 2/11/2019; Not met, though reports partial compliance 2/18/2019              2. The patient will improve sit to stand from standard height without increase in hip pain to maximize ease of transfers. Progressed to 8 times at 55cm 2/18/2019  Long Term Goals: To be accomplished in 4 weeks:              1. The patient will improve FOTO score to 54 to maximize quality of life.              2. The patient will improve hip ABD to 4/5 MMT to maximize stability in stance.              3. The patient will improve negotiate stairs with a little bit of difficulty to maximize ease of stair negotiation.              4. The patient will demonstrate negative 90/90 HS flexibility test to improve ease of ambulation.     PLAN  [x]  Upgrade activities as tolerated     [x]  Continue plan of care  []  Update interventions per flow sheet       []  Discharge due to:_  []  Other:_      Art Shore, PT, DPT, ATC 2/25/2019  9:44 AM    Future Appointments   Date Time Provider Christine Pablo   2/27/2019  9:30 AM [facility]   3/5/2019 10:00 AM Lethia Harada, PT [facility]   8/2/2019  9:20 AM Abdelrahman Jose MD [address] [place] [address]

## 2019-02-27 ENCOUNTER — HOSPITAL ENCOUNTER (OUTPATIENT)
Dept: PHYSICAL THERAPY | Age: 69
Discharge: HOME | End: 2019-02-27
Payer: MEDICARE

## 2019-02-27 PROCEDURE — 97140 MANUAL THERAPY 1/> REGIONS: CPT

## 2019-02-27 PROCEDURE — 97112 NEUROMUSCULAR REEDUCATION: CPT

## 2019-02-27 PROCEDURE — 97110 THERAPEUTIC EXERCISES: CPT

## 2019-02-27 NOTE — PROGRESS NOTES
PT DAILY TREATMENT NOTE 10-18    Patient Name: Hazel Torres  Date:2019  : 1950  [x]  Patient  Verified  Payor: [place] MEDICARE / Plan: [place] MEDICARE PART A & B / Product Type: Medicare /   In time:9:30  Out time:10:11  Total Treatment Time (min): 41  Visit #: 6 of 8     Medicare/BCBS Only   Total Timed Codes (min):  41 1:1 Treatment Time:  38       Treatment Area: Pain in right leg [M79.604]  Pain in left leg [M79.605]  Iliotibial band syndrome, right leg [M76.31]    SUBJECTIVE  Pain Level (0-10 scale): 1-10  Any medication changes, allergies to medications, adverse drug reactions, diagnosis change, or new procedure performed?: [x] No    [] Yes (see summary sheet for update)  Subjective functional status/changes:   [] No changes reported  The patient indicates that her pain feels better today upon arrival. She indicates that she does have pain at night, however, but reports improvement up to this point. OBJECTIVE   21 min Therapeutic Exercise:  [x] See flow sheet :   Rationale: increase ROM and increase strength to improve the patients ability to improve ADL ease. 12 min Neuromuscular Re-education:  [x]  See flow sheet :   Rationale: increase ROM and increase strength  to improve the patients ability to improve ADL ease. 8 min Manual Therapy:  Stick to right vastus lateralis    Rationale: decrease pain, increase ROM and increase tissue extensibility to improve ADL ease.         With   [] TE   [] TA   [] neuro   [] other: Patient Education: [x] Review HEP    [] Progressed/Changed HEP based on:   [] positioning   [] body mechanics   [] transfers   [] heat/ice application    [] other:      Other Objective/Functional Measures:   Hamstring 90/90 test: 27 degrees right, 29 degrees left   Hip ABD strength: 4-/5 MMR left, 3+/5 MMT right     Sit to stands: 52cm 10 times (reduced seat height from 60 cm at initial transfer height)    Pain Level (0-10 scale) post treatment: 3/10    ASSESSMENT/Changes in Function: The patient is progressing with regards to hamstring flexibility noting upper 20's with 90/90 hamstring flexibility test. Her hip ABD remains limited, though pain levels somewhat decreased with regards to ABD. Her transfers have improved and she notes less pain with this performance as well. The patient will continue to benefit from skilled PT in order to progress strengthening. Patient will continue to benefit from skilled PT services to modify and progress therapeutic interventions, address functional mobility deficits, address ROM deficits, address strength deficits, analyze and address soft tissue restrictions, analyze and cue movement patterns, analyze and modify body mechanics/ergonomics, assess and modify postural abnormalities and instruct in home and community integration to attain remaining goals. []  See Plan of Care  []  See progress note/recertification  []  See Discharge Summary         Progress towards goals / Updated goals:  Short Term Goals: To be accomplished in 2 weeks:              8. The patient will demonstrate independence and compliant with HEP to maximize therapeutic benefit. Not met 2/11/2019; Not met, though reports partial compliance 2/18/2019              2. The patient will improve sit to stand from standard height without increase in hip pain to maximize ease of transfers. Progressed to 8 times at 55cm 2/18/2019  Long Term Goals: To be accomplished in 4 weeks:              1. The patient will improve FOTO score to 54 to maximize quality of life.              2. The patient will improve hip ABD to 4/5 MMT to maximize stability in stance. 4-/5 MMT left, 3+/5 MMT right 2/27/2019              3. The patient will improve negotiate stairs with a little bit of difficulty to maximize ease of stair negotiation.               4. The patient will demonstrate negative 90/90 HS flexibility test to improve ease of ambulation.  Improved to 27/29 degrees right/left 2/27/2019    PLAN  []  Upgrade activities as tolerated     [x]  Continue plan of care  []  Update interventions per flow sheet       []  Discharge due to:_  []  Other:_      Jazmín Wyatt PT 2/27/2019  9:39 AM    Future Appointments   Date Time Provider Christine Pablo   3/5/2019 10:00 AM Belkys Frost PT [facility]   8/2/2019  9:20 AM Joanna Desai MD [address] [place] [address]

## 2019-03-05 ENCOUNTER — HOSPITAL ENCOUNTER (OUTPATIENT)
Dept: PHYSICAL THERAPY | Age: 69
Discharge: HOME | End: 2019-03-05
Payer: MEDICARE

## 2019-03-05 PROCEDURE — 97112 NEUROMUSCULAR REEDUCATION: CPT

## 2019-03-05 PROCEDURE — 97140 MANUAL THERAPY 1/> REGIONS: CPT

## 2019-03-05 PROCEDURE — 97110 THERAPEUTIC EXERCISES: CPT

## 2019-03-05 NOTE — PROGRESS NOTES
PT DAILY TREATMENT NOTE 10-18    Patient Name: Mathieu García  Date:3/5/2019  : 1950  [x]  Patient  Verified  Payor: [employer] / Plan: [place] MEDICARE PART A & B / Product Type: Medicare /    In time:10:00  Out time:10:40  Total Treatment Time (min): 40  Visit #: 7 of 8    Medicare/BCBS Only   Total Timed Codes (min): 40 1:1 Treatment Time:  40     Treatment Area: Pain in right leg [M79.604]  Pain in left leg [M79.605]  Iliotibial band syndrome, right leg [M76.31]    SUBJECTIVE  Pain Level (0-10 scale): 3/10  Any medication changes, allergies to medications, adverse drug reactions, diagnosis change, or new procedure performed?: [x] No    [] Yes (see summary sheet for update)  Subjective functional status/changes:   [] No changes reported  The patient states that her hip is feeling better, but that she does have some discomfort with sitting to standing still, though it has improved. OBJECTIVE  22 min Therapeutic Exercise:  [x] See flow sheet :   Rationale: increase ROM and increase strength to improve the patients ability to improve ADL ease. 10 min Neuromuscular Re-education:  [x]  See flow sheet :   Rationale: increase ROM and increase strength  to improve the patients ability to improve ADL ease. 8 min Manual Therapy:  Stick to right vastus lateralis/ITB   Rationale: decrease pain, increase ROM and increase tissue extensibility to improve ADL ease. With   [] TE   [] TA   [] neuro   [] other: Patient Education: [x] Review HEP    [] Progressed/Changed HEP based on:   [] positioning   [] body mechanics   [] transfers   [] heat/ice application    [] other:      Other Objective/Functional Measures: FOTO: 35    Pain Level (0-10 scale) post treatment: 3/10    ASSESSMENT/Changes in Function: The patient has progressed with regards to strength and flexibility.  Her sit to stands have significantly improved up to this point noting ease of 50 cm height (initially able to attain 5 at 60 cm height.) Her FOTO score has declined by 3 points, but does not accurately portray her improvements up to this point. She will continue to benefit from skilled PT for an additional 3 weeks to progress strengthening and improved functional ease. Patient will continue to benefit from skilled PT services to modify and progress therapeutic interventions, address functional mobility deficits, address ROM deficits, address strength deficits, analyze and address soft tissue restrictions, analyze and cue movement patterns, analyze and modify body mechanics/ergonomics, assess and modify postural abnormalities and instruct in home and community integration to attain remaining goals. []  See Plan of Care  [x]  See progress note/recertification  []  See Discharge Summary         Progress towards goals / Updated goals:  Short Term Goals: To be accomplished in 2 weeks:              6. The patient will demonstrate independence and compliant with HEP to maximize therapeutic benefit. Not met 2/11/2019; Not met, though reports partial compliance 2/18/2019              2. The patient will improve sit to stand from standard height without increase in hip pain to maximize ease of transfers. Progressed to 8 times at 55cm 2/18/2019  Long Term Goals: To be accomplished in 4 weeks:              1. The patient will improve FOTO score to 54 to maximize quality of life. Not met - 35 3/05/2019              2. The patient will improve hip ABD to 4/5 MMT to maximize stability in stance. 4-/5 MMT left, 3+/5 MMT right 2/27/2019              3. The patient will improve negotiate stairs with a little bit of difficulty to maximize ease of stair negotiation. Remains quite a bit of difficulty 3/05/2019              4. The patient will demonstrate negative 90/90 HS flexibility test to improve ease of ambulation.  Improved to 27/29 degrees right/left 2/27/2019     PLAN  []  Upgrade activities as tolerated     [x]  Continue plan of care  []  Update interventions per flow sheet       []  Discharge due to:_  []  Other:_      Kenan Arthur, PT 3/5/2019  10:59 AM    Future Appointments   Date Time Provider Christine Pablo   3/7/2019  9:30 AM Lynn Grijalva, PT [facility]   3/11/2019  3:30 PM Lynn Grijalva, PT [facility]   3/13/2019  9:30 AM Cedric Jean, PT [facility]   3/18/2019 11:00 AM Cedric Jean, PT [facility]   3/20/2019  2:00 PM Lynn Grijalva, PT [facility]   3/25/2019 10:00 AM Darlene Almaguer, PTA [facility]   8/2/2019  9:20 AM Tracy Santana MD [address] [place] [address]

## 2019-03-05 NOTE — PROGRESS NOTES
[facility]  [address]  [place], [address]  [phone] fax    Medicare Progress Report    Patient name: Mai Roper Start of Care: 2019   Referral source: Humble Miller : 1950               Medical Diagnosis: Pain in right leg [M79.604]  Pain in left leg [M79.605]  Iliotibial band syndrome, right leg [M76.31]  Payor: VA MEDICARE / Plan: VA MEDICARE PART A & B / Product Type: Medicare /  Onset Date: About a month ago               Treatment Diagnosis: bilateral hip pain   Prior Hospitalization: see medical history Provider#: [id number]   Medications: Verified on Patient summary List    Comorbidities: Diabetes, HTN, BMI, Incontinence   Prior Level of Function: The patient states that she had improved ease of standing, and transfers prior to onset. Visits from Start of Care: 7    Missed Visits: 0    Reporting Period: 2019 to 3/05/2019    Subjective Reports: The patient states that her hip is feeling better, but that she does have some discomfort with sitting to standing still, though it has improved. Current Status/ treatment goals Objective measures   Short Term Goals: To be accomplished in 2 weeks:              7. The patient will demonstrate independence and compliant with HEP to maximize therapeutic benefit. Not met 2019; Not met, though reports partial compliance 2019              2. The patient will improve sit to stand from standard height without increase in hip pain to maximize ease of transfers. Progressed to 8 times at 55cm 2019  Long Term Goals: To be accomplished in 4 weeks:              1. The patient will improve FOTO score to 54 to maximize quality of life. Not met - 35 3/05/2019              2. The patient will improve hip ABD to 4/5 MMT to maximize stability in stance. 4-/5 MMT left, 3+/5 MMT right 2019              3.  The patient will improve negotiate stairs with a little bit of difficulty to maximize ease of stair negotiation.  Remains quite a bit of difficulty 3/05/2019              4. The patient will demonstrate negative 90/90 HS flexibility test to improve ease of ambulation. Improved to 27/29 degrees right/left 2/27/2019    Key functional changes: Sit to stand improved ease, ambulation improved ease with decreased pain. Problems/ barriers to goal attainment: BMI > 30     Assessment / Recommendations: The patient has progressed with regards to strength and flexibility. Her sit to stands have significantly improved up to this point noting ease of 50 cm height (initially able to attain 5 at 60 cm height.) Her FOTO score has declined by 3 points, but does not accurately portray her improvements up to this point. She will continue to benefit from skilled PT for an additional 3 weeks to progress strengthening and improved functional ease. Problem List: pain affecting function, decrease ROM, decrease strength, impaired gait/ balance, decrease ADL/ functional abilitiies, decrease activity tolerance, decrease flexibility/ joint mobility and decrease transfer abilities   Treatment Plan: Therapeutic exercise, Therapeutic activities, Neuromuscular re-education, Physical agent/modality, Manual therapy, Patient education, Self Care training, Functional mobility training, Home safety training and Stair training    Patient Goal (s) has been updated and includes: decrease pain     Updated Goals to be accomplished in 3 weeks:              1. The patient will improve FOTO score to 54 to maximize quality of life. Not met - 35 3/05/2019              2. The patient will improve hip ABD to 4/5 MMT to maximize stability in stance. 4-/5 MMT left, 3+/5 MMT right 2/27/2019              3. The patient will improve negotiate stairs with a little bit of difficulty to maximize ease of stair negotiation.  Remains quite a bit of difficulty 3/05/2019              4.  The patient will demonstrate negative 90/90 HS flexibility test to improve ease of ambulation.  Improved to 27/29 degrees right/left 2/27/2019      Frequency / Duration: Patient to be seen 2 times per week for 3 weeks:      Bay Hoffman, PT 3/5/2019 11:16 AM

## 2019-03-07 ENCOUNTER — HOSPITAL ENCOUNTER (OUTPATIENT)
Dept: PHYSICAL THERAPY | Age: 69
Discharge: HOME | End: 2019-03-07
Payer: MEDICARE

## 2019-03-07 PROCEDURE — 97110 THERAPEUTIC EXERCISES: CPT

## 2019-03-07 PROCEDURE — 97140 MANUAL THERAPY 1/> REGIONS: CPT

## 2019-03-07 NOTE — PROGRESS NOTES
PT DAILY TREATMENT NOTE 10-18    Patient Name: Diogo Sandhu  Date:3/7/2019  : 1950  [x]  Patient  Verified  Payor: VA MEDICARE / Plan: VA MEDICARE PART A & B / Product Type: Medicare /    In time:9:30  Out time:10:09  Total Treatment Time (min): 39  Visit #: 1 of 6    Medicare/BCBS Only   Total Timed Codes (min):  39 1:1 Treatment Time:  34       Treatment Area: Pain in right leg [M79.604]  Pain in left leg [M79.605]  Iliotibial band syndrome, right leg [M76.31]    SUBJECTIVE  Pain Level (0-10 scale): 3-4  Any medication changes, allergies to medications, adverse drug reactions, diagnosis change, or new procedure performed?: [x] No    [] Yes (see summary sheet for update)  Subjective functional status/changes:   [] No changes reported  \"Pain is manageable today\". OBJECTIVE      31 min Therapeutic Exercise:  [x] See flow sheet :   Rationale: increase ROM and increase strength to improve the patients ability to perform ADLs with improved ease. 8 min Manual Therapy:  Roller stick on right IT band and vastus lateralis   Rationale: decrease pain, increase ROM and increase tissue extensibility to improve patients functional mobility. With   [] TE   [] TA   [] neuro   [] other: Patient Education: [x] Review HEP    [] Progressed/Changed HEP based on:   [] positioning   [] body mechanics   [] transfers   [] heat/ice application    [] other:      Other Objective/Functional Measures: Added 6 inch step ups today     Pain Level (0-10 scale) post treatment: 5    ASSESSMENT/Changes in Function: Slight increase in pain noted post session. Frequent rest breaks taken today due to fatigue. Continue progressing strengthening exercises in order to improve patients ease of ADLs and functional mobility.       Patient will continue to benefit from skilled PT services to modify and progress therapeutic interventions, address functional mobility deficits, address ROM deficits, address strength deficits, analyze and address soft tissue restrictions, analyze and cue movement patterns, analyze and modify body mechanics/ergonomics and assess and modify postural abnormalities to attain remaining goals. [x]  See Plan of Care  []  See progress note/recertification  []  See Discharge Summary         Progress towards goals / Updated goals:  Updated Goals to be accomplished in 3 weeks:              1. The patient will improve FOTO score to 54 to maximize quality of life. Not met - 35 3/05/2019              2. The patient will improve hip ABD to 4/5 MMT to maximize stability in stance. 4-/5 MMT left, 3+/5 MMT right 2/27/2019              3. The patient will improve negotiate stairs with a little bit of difficulty to maximize ease of stair negotiation.  Remains quite a bit of difficulty 3/05/2019              4. The patient will demonstrate negative 90/90 HS flexibility test to improve ease of ambulation.  Improved to 27/29 degrees right/left 2/27/2019    PLAN  []  Upgrade activities as tolerated     [x]  Continue plan of care  []  Update interventions per flow sheet       []  Discharge due to:_  []  Other:_      Eloina Walsh PT 3/7/2019  9:33 AM    Future Appointments   Date Time Provider Christine Pablo   3/11/2019  3:30 PM Leonides Meléndez, PT [facility]   3/13/2019  9:30 AM Paula Peterson, PT [facility]   3/18/2019 11:00 AM Paula Peterson, PT [facility]   3/20/2019  2:00 PM Leonides Meléndez, PT [facility]   3/25/2019 10:00 AM Rita Freeman, PTA [facility]   3/25/2019  1:30 PM MD AMANDA Pacheco   8/2/2019  9:20 AM Daron Doherty MD [address] [place] [address]

## 2019-03-11 ENCOUNTER — HOSPITAL ENCOUNTER (OUTPATIENT)
Dept: PHYSICAL THERAPY | Age: 69
Discharge: HOME | End: 2019-03-11
Payer: MEDICARE

## 2019-03-11 PROCEDURE — 97140 MANUAL THERAPY 1/> REGIONS: CPT

## 2019-03-11 PROCEDURE — 97110 THERAPEUTIC EXERCISES: CPT

## 2019-03-13 ENCOUNTER — HOSPITAL ENCOUNTER (OUTPATIENT)
Dept: PHYSICAL THERAPY | Age: 69
Discharge: HOME | End: 2019-03-13
Payer: MEDICARE

## 2019-03-13 PROCEDURE — 97110 THERAPEUTIC EXERCISES: CPT

## 2019-03-13 PROCEDURE — 97140 MANUAL THERAPY 1/> REGIONS: CPT

## 2019-03-13 NOTE — PROGRESS NOTES
PT DAILY TREATMENT NOTE 10-18    Patient Name: Cruz Solano  Date:3/13/2019  : 1950  [x]  Patient  Verified  Payor: VA MEDICARE / Plan: VA MEDICARE PART A & B / Product Type: Medicare /    In time:940  Out time:1026  Total Treatment Time (min): 46  Visit #: 3 of 6    Medicare/BCBS Only   Total Timed Codes (min):  36 1:1 Treatment Time:  30       Treatment Area: Pain in right leg [M79.604]  Pain in left leg [M79.605]  Iliotibial band syndrome, right leg [M76.31]    SUBJECTIVE  Pain Level (0-10 scale): 5  Any medication changes, allergies to medications, adverse drug reactions, diagnosis change, or new procedure performed?: [x] No    [] Yes (see summary sheet for update)  Subjective functional status/changes:   [] No changes reported  Patient reported continued pain in standing and with amb that is relieved when seated.     OBJECTIVE    Modality rationale: decrease pain and increase tissue extensibility to improve the patients ability to perform ADLs   Min Type Additional Details    [] Estim:  []Unatt       []IFC  []Premod                        []Other:  []w/ice   []w/heat  Position:  Location:    [] Estim: []Att    []TENS instruct  []NMES                    []Other:  []w/US   []w/ice   []w/heat  Position:  Location:    []  Traction: [] Cervical       []Lumbar                       [] Prone          []Supine                       []Intermittent   []Continuous Lbs:  [] before manual  [] after manual    []  Ultrasound: []Continuous   [] Pulsed                           []1MHz   []3MHz W/cm2:  Location:    []  Iontophoresis with dexamethasone         Location: [] Take home patch   [] In clinic   10 []  Ice     [x]  heat  []  Ice massage  []  Laser   []  Anodyne Position:L S/L  Location:Right ITB and lateral quad    []  Laser with stim  []  Other:  Position:  Location:    []  Vasopneumatic Device Pressure:       [] lo [] med [] hi   Temperature: [] lo [] med [] hi   [] Skin assessment post-treatment: []intact []redness- no adverse reaction    []redness - adverse reaction:     26 min Therapeutic Exercise:  [] See flow sheet :   Rationale: increase ROM, increase strength, improve coordination and improve balance to improve the patients ability to functional mobility      10 min Manual Therapy:  Stick to right IT band and vastus lateralis   Rationale: decrease pain, increase tissue extensibility and decrease trigger points to increase functional mobility            With   [] TE   [] TA   [] neuro   [] other: Patient Education: [x] Review HEP    [] Progressed/Changed HEP based on:   [] positioning   [] body mechanics   [] transfers   [] heat/ice application    [] other:      Other Objective/Functional Measures: increased reps step ups and sit to stands with minimal c/o increased pain; held IR and abd S/L clams on right per patient request due to increased pain lateral aspect R thigh and hip. Patient didn't tolerate S/L      Pain Level (0-10 scale) post treatment: 4    ASSESSMENT/Changes in Function: Patient demonstrated improved control with eccentric lowering quads sitting and stepping down today but continues to be limited by pain and limited ROM right hip. Patient will continue to benefit from skilled PT services to modify and progress therapeutic interventions, address functional mobility deficits, address ROM deficits, address strength deficits, analyze and address soft tissue restrictions, analyze and cue movement patterns and analyze and modify body mechanics/ergonomics to attain remaining goals. [x]  See Plan of Care  []  See progress note/recertification  []  See Discharge Summary         Progress towards goals / Updated goals:  Updated Goals to be accomplished in 3 weeks:              1. The patient will improve FOTO score to 54 to maximize quality of life. Not met - 35 3/05/2019              2. The patient will improve hip ABD to 4/5 MMT to maximize stability in stance.  4-/5 MMT left, 3+/5 MMT right 2/27/2019              3. The patient will improve negotiate stairs with a little bit of difficulty to maximize ease of stair negotiation.  Remains quite a bit of difficulty 3/05/2019              4. The patient will demonstrate negative 90/90 HS flexibility test to improve ease of ambulation.  Improved to 27/29 degrees right/left 2/27/2019         PLAN  []  Upgrade activities as tolerated     [x]  Continue plan of care  []  Update interventions per flow sheet       []  Discharge due to:_  []  Other:_      Lisa Lackey PTA 3/13/2019  10:53 AM    Future Appointments   Date Time Provider Christine Pablo   3/18/2019 11:00 AM Doroteo Ricardo PTA [facility]   3/20/2019  2:00 PM Apollo Joseph PT [facility]   3/25/2019 10:00 AM Jose Avendnao PTA [facility]   3/25/2019  1:30 PM Konrad Collado MD [facility]   8/2/2019  9:20 AM Vero Rivas  [facility]

## 2019-03-18 ENCOUNTER — HOSPITAL ENCOUNTER (OUTPATIENT)
Dept: PHYSICAL THERAPY | Age: 69
Discharge: HOME | End: 2019-03-18
Payer: MEDICARE

## 2019-03-18 PROCEDURE — 97110 THERAPEUTIC EXERCISES: CPT

## 2019-03-18 PROCEDURE — 97140 MANUAL THERAPY 1/> REGIONS: CPT

## 2019-03-18 NOTE — PROGRESS NOTES
PT DAILY TREATMENT NOTE 10-18    Patient Name: Nicole Hyatt  Date:3/18/2019  : 1950  [x]  Patient  Verified  Payor: [place] MEDICARE / Plan: [place] MEDICARE PART A & B / Product Type: Medicare /    In time:1100  Out time:1140  Total Treatment Time (min): 40  Visit #: 4 of 6    Medicare/BCBS Only   Total Timed Codes (min):  40 1:1 Treatment Time:  25       Treatment Area: Pain in right leg [M79.604]  Pain in left leg [M79.605]  Iliotibial band syndrome, right leg [M76.31]    SUBJECTIVE  Pain Level (0-10 scale): 4  Any medication changes, allergies to medications, adverse drug reactions, diagnosis change, or new procedure performed?: [x] No    [] Yes (see summary sheet for update)  Subjective functional status/changes:   [] No changes reported  Patient reported lateral left hip pain after last viait localized at greater trochanter.     OBJECTIVE    Modality rationale: decrease inflammation, decrease pain and increase tissue extensibility to improve the patients ability to perform ADLs   Type Additional Details   [] Estim:  []Unatt       []IFC  []Premod                        []Other:  []w/ice   []w/heat  Position:  Location:   [] Estim: []Att    []TENS instruct  []NMES                    []Other:  []w/US   []w/ice   []w/heat  Position:  Location:   []  Traction: [] Cervical       []Lumbar                       [] Prone          []Supine                       []Intermittent   []Continuous Lbs:  [] before manual  [] after manual   []  Ultrasound: []Continuous   [] Pulsed                           []1MHz   []3MHz W/cm2:  Location:   []  Iontophoresis with dexamethasone         Location: [] Take home patch   [] In clinic   [x]  Ice     []  heat  []  Ice massage  []  Laser   []  Anodyne Position:left S/L   Location:right ITB and lateral quad   []  Laser with stim  []  Other:  Position:  Location:   []  Vasopneumatic Device Pressure:       [] lo [] med [] hi   Temperature: [] lo [] med [] hi   [] Skin assessment post-treatment:  []intact []redness- no adverse reaction    []redness - adverse reaction:         20 min Therapeutic Exercise:  [x] See flow sheet :   Rationale: increase ROM and increase strength to improve the patients ability to perform ADLs      10 min Manual Therapy:  Stick to right IT band and vastus lateralis   Rationale: decrease pain, increase tissue extensibility and decrease trigger points to increase functional mobility                 With   [] TE   [] TA   [] neuro   [] other: Patient Education: [x] Review HEP    [] Progressed/Changed HEP based on:   [] positioning   [] body mechanics   [] transfers   [] heat/ice application    [] other:      Other Objective/Functional Measures: HS 90/90: 20/22 degrees, right/left     Pain Level (0-10 scale) post treatment: 3    ASSESSMENT/Changes in Function: patient reported lateral hip pain localized at greater trochanter. Patient did demonstrate improving flexibility as HS 90/90 test improved to 20/22 degrees right/left (previously 27/29). Patient will continue to benefit from skilled PT services to modify and progress therapeutic interventions, address functional mobility deficits, address ROM deficits, address strength deficits, analyze and address soft tissue restrictions, analyze and cue movement patterns and analyze and modify body mechanics/ergonomics to attain remaining goals. [x]  See Plan of Care  []  See progress note/recertification  []  See Discharge Summary         Progress towards goals / Updated goals:  Updated Goals to be accomplished in 3 weeks:              1. The patient will improve FOTO score to 54 to maximize quality of life. Not met - 35 3/05/2019              2. The patient will improve hip ABD to 4/5 MMT to maximize stability in stance. 4-/5 MMT left, 3+/5 MMT right 2/27/2019              3.  The patient will improve negotiate stairs with a little bit of difficulty to maximize ease of stair negotiation.  Remains quite a bit of difficulty 3/05/2019              4. The patient will demonstrate negative 90/90 HS flexibility test to improve ease of ambulation.  Improved to 27/29 degrees right/left 2/27/2019. ; improved 20/22, right/left (3/18/19)         PLAN  []  Upgrade activities as tolerated     [x]  Continue plan of care  []  Update interventions per flow sheet       []  Discharge due to:_  []  Other:_      Darlis Nissen, PTA 3/18/2019  11:15 AM    Future Appointments   Date Time Provider Christine Petersi   3/20/2019  2:00 PM [facility]   3/25/2019 10:00 AM [facility]   3/25/2019  1:30 PM Dominique Spencer MD [address]   8/2/2019  9:20 AM Kassi Davis  [facility]

## 2019-03-20 ENCOUNTER — HOSPITAL ENCOUNTER (OUTPATIENT)
Dept: PHYSICAL THERAPY | Age: 69
Discharge: HOME | End: 2019-03-20
Payer: MEDICARE

## 2019-03-20 ENCOUNTER — HOSPITAL ENCOUNTER (OUTPATIENT)
Dept: GENERAL RADIOLOGY | Age: 69
Discharge: HOME | End: 2019-03-20
Payer: MEDICARE

## 2019-03-20 DIAGNOSIS — M25.551 RIGHT HIP PAIN: ICD-10-CM

## 2019-03-20 PROCEDURE — 73502 X-RAY EXAM HIP UNI 2-3 VIEWS: CPT

## 2019-03-20 PROCEDURE — 97110 THERAPEUTIC EXERCISES: CPT

## 2019-03-20 PROCEDURE — 97140 MANUAL THERAPY 1/> REGIONS: CPT

## 2019-03-20 NOTE — PROGRESS NOTES
PT DAILY TREATMENT NOTE 10-18    Patient Name: Elver Willson  Date:3/20/2019  : 1950  [x]  Patient  Verified  Payor: VA MEDICARE / Plan: VA MEDICARE PART A & B / Product Type: Medicare /    In time:903 Out time:948  Total Treatment Time (min): 45  Visit #: 5 of 6    Medicare/BCBS Only   Total Timed Codes (min):  45 1:1 Treatment Time:  45       Treatment Area: Pain in right leg [M79.604]  Pain in left leg [M79.605]  Iliotibial band syndrome, right leg [M76.31]    SUBJECTIVE  Pain Level (0-10 scale): 3-4  Any medication changes, allergies to medications, adverse drug reactions, diagnosis change, or new procedure performed?: [x] No    [] Yes (see summary sheet for update)  Subjective functional status/changes:   [] No changes reported  Patient continues to report lateral left hip pain localized at greater trochanter and affecting how she is walking. OBJECTIVE    37 min Therapeutic Exercise:  [x] See flow sheet :   Rationale: increase ROM and increase strength to improve the patients ability to perform ADLs      8 min Manual Therapy:  Stick to right IT band and vastus lateralis   Rationale: decrease pain, increase tissue extensibility and decrease trigger points to increase functional mobility        With   [] TE   [] TA   [] neuro   [] other: Patient Education: [x] Review HEP    [] Progressed/Changed HEP based on:   [] positioning   [] body mechanics   [] transfers   [] heat/ice application    [] other:      Other Objective/Functional Measures: added hip hike and toe taps on 2\" step without UE assist, increased reps sit<>stand tp 15 reps    Pain Level (0-10 scale) post treatment: 3    ASSESSMENT/Changes in Function: Patient continues to present with localized pain in right hip impacting gait, but tolerated new therex without c/o increased pain including hip hikes and toe taps, and increased reps sit<>stands.    Patient will continue to benefit from skilled PT services to modify and progress therapeutic interventions, address functional mobility deficits, address ROM deficits, address strength deficits, analyze and address soft tissue restrictions, analyze and cue movement patterns and analyze and modify body mechanics/ergonomics to attain remaining goals. [x]  See Plan of Care  []  See progress note/recertification  []  See Discharge Summary         Progress towards goals / Updated goals:  Updated Goals to be accomplished in 3 weeks:              1. The patient will improve FOTO score to 54 to maximize quality of life. Not met - 35 3/05/2019              2. The patient will improve hip ABD to 4/5 MMT to maximize stability in stance. 4-/5 MMT left, 3+/5 MMT right 2/27/2019              3. The patient will improve negotiate stairs with a little bit of difficulty to maximize ease of stair negotiation.  Remains quite a bit of difficulty 3/05/2019              4. The patient will demonstrate negative 90/90 HS flexibility test to improve ease of ambulation.  Improved to 27/29 degrees right/left 2/27/2019. ; improved 20/22, right/left (3/18/19)         PLAN  []  Upgrade activities as tolerated     [x]  Continue plan of care  []  Update interventions per flow sheet       []  Discharge due to:_  []  Other:_      Zoë Biggs PTA 3/20/2019  11:15 AM    Future Appointments   Date Time Provider Christine Pablo   3/25/2019 10:00 AM Ovidio Chris PTA [id number]   3/25/2019  1:30 PM MD AMANDA [place] [id number]   8/2/2019  9:20 AM Dedrick Granados MD [address] [place] [address]

## 2019-03-25 ENCOUNTER — HOSPITAL ENCOUNTER (OUTPATIENT)
Dept: PHYSICAL THERAPY | Age: 69
Discharge: HOME | End: 2019-03-25
Payer: MEDICARE

## 2019-03-25 PROCEDURE — 97110 THERAPEUTIC EXERCISES: CPT

## 2019-03-25 PROCEDURE — 97140 MANUAL THERAPY 1/> REGIONS: CPT

## 2019-03-25 NOTE — PROGRESS NOTES
PT DAILY TREATMENT NOTE 10-18    Patient Name: Lacy Aparicio  Date:3/25/2019  : 1950  [x]  Patient  Verified  Payor: VA MEDICARE / Plan: VA MEDICARE PART A & B / Product Type: Medicare /    In time:10:04  Out time:10:45  Total Treatment Time (min): 41  Visit #: 6 of 6    Medicare/BCBS Only   Total Timed Codes (min):  41 1:1 Treatment Time:  26       Treatment Area: Pain in right leg [M79.604]  Pain in left leg [M79.605]  Iliotibial band syndrome, right leg [M76.31]    SUBJECTIVE  Pain Level (0-10 scale): 3  Any medication changes, allergies to medications, adverse drug reactions, diagnosis change, or new procedure performed?: [x] No    [] Yes (see summary sheet for update)  Subjective functional status/changes:   [] No changes reported  Pt reports feeling like she is getting a little better    OBJECTIVE    33 min Therapeutic Exercise:  [x] See flow sheet :   Rationale: increase ROM and increase strength to improve the patients ability to perform ADLs    8 min Manual Therapy:  IASTM to right IT band, glutes, and vastus lateralis   Rationale: decrease pain, increase ROM and increase tissue extensibility to improve functional mobility             With   [] TE   [] TA   [] neuro   [] other: Patient Education: [x] Review HEP    [] Progressed/Changed HEP based on:   [] positioning   [] body mechanics   [] transfers   [] heat/ice application    [] other:      Other Objective/Functional Measures:   FOTO 48  MMT hip abd left 4/5  right 3+/5 with pain  90/90 HS flexibility right 18* left 20*  60% subjective improvement since SOC    Pain Level (0-10 scale) post treatment: 3    ASSESSMENT/Changes in Function: Pt has made some progress in PT towards improved LE flexibility and strength, improved ease with stair negotiation, and reports 60% subjective improvement since SOC. Pt cont's to report difficulty performing sit to stand due to pain and ascends stairs with step-to pattern with right hip pain.  Reviewed HEP for cont'd LE stretch/ strengthening, pt is prepared for D/C from PT at this time. []  See Plan of Care  []  See progress note/recertification  []  See Discharge Summary         Progress towards goals / Updated goals:  Updated Goals to be accomplished in 3 weeks:              1. The patient will improve FOTO score to 54 to maximize quality of life. Not met - 35 3/05/2019  Not met FOTO 48 (3/25/19)                2. The patient will improve hip ABD to 4/5 MMT to maximize stability in stance. 4-/5 MMT left, 3+/5 MMT right 2/27/2019; Partially met  MMT hip abd left 4/5  right 3+/5 with pain (3/25/19)              3. The patient will improve negotiate stairs with a little bit of difficulty to maximize ease of stair negotiation.  Remains quite a bit of difficulty 3/05/2019; Met- pt negotiates stairs with a little bit of difficulty ascending and performs step-to pattern, descending reciprocally (3/25/19)              4. The patient will demonstrate negative 90/90 HS flexibility test to improve ease of ambulation. Improved to 27/29 degrees right/left 2/27/2019. ; improved 20/22, right/left (3/18/19);  Improved right 18* left 20* (3/25/19)        PLAN  []  Upgrade activities as tolerated     []  Continue plan of care  []  Update interventions per flow sheet       [x]  Discharge due to:_program complete  []  Other:_      Jo Gamboa PTA 3/25/2019  10:07 AM    Future Appointments   Date Time Provider Christine Pablo   8/2/2019  9:20 AM Alisa Montgomery MD [address] [place] [address]

## 2019-04-16 NOTE — PROGRESS NOTES
In [address] [place], [address] 
[phone] fax Physical Therapy Discharge Summary Patient name: Adriana George of Care: 2/6/2019 Referral source: Otis Wade Kev in right leg [S38.177] Pain in left leg [M79.605] Iliotibial band syndrome, right leg [M76.31] Payor: VA MEDICARE / Plan: [employer] / Product Type: Medicare /  Onset Date: About a month ago              
Treatment Diagnosis: bilateral hip pain Prior Hospitalization: see medical history Provider#: [id number] Medications: Verified on Patient summary List  
 Comorbidities: Diabetes, HTN, BMI, Incontinence 
 Prior Level of Function: The patient states that she had improved ease of standing, and transfers prior to onset. Visits from Start of Care: 13    Missed Visits: *** Reporting Period : *** to *** Summary of Care: 
Updated Goals to be accomplished in 3 weeks: 
            1. The patient will improve FOTO score to 54 to maximize quality of life. Not met - 35 3/05/2019 Not met FOTO 48 (3/25/19)   
            2. The patient will improve hip ABD to 4/5 MMT to maximize stability in stance. 4-/5 MMT left, 3+/5 MMT right 2/27/2019; Partially met  MMT hip abd left 4/5  right 3+/5 with pain (3/25/19)             3. The patient will improve negotiate stairs with a little bit of difficulty to maximize ease of stair negotiation.  Remains quite a bit of difficulty 3/05/2019; Met- pt negotiates stairs with a little bit of difficulty ascending and performs step-to pattern, descending reciprocally (3/25/19)             4. The patient will demonstrate negative 90/90 HS flexibility test to improve ease of ambulation. Improved to 27/29 degrees right/left 2/27/2019. ; improved 20/22, right/left (3/18/19);  Improved right 18* left 20* (3/25/19) 
  
 
 
 
 ASSESSMENT/RECOMMENDATIONS:  Pt has made some progress in PT towards improved LE flexibility and strength, improved ease with stair negotiation, and reports 60% subjective improvement since [facility]. Pt cont's to report difficulty performing sit to stand due to pain and ascends stairs with step-to pattern with right hip pain. Reviewed HEP for cont'd LE stretch/ strengthening, pt is prepared for D/C from PT at this time. []Discontinue therapy: []Patient has reached or is progressing toward set goals []Patient is non-compliant or has abdicated 
    []Due to lack of appreciable progress towards set goals Radha Barr, PT 4/16/2019 11:14 AM

## 2019-04-19 NOTE — PROGRESS NOTES
[facility]  [address] [facility] [address]  [place], [address]  [phone] fax    Physical Therapy Discharge Summary  Patient name: Damien Hughes Start of Care: 19   Referral source: Tj Mcdonald * : 1950   Medical/Treatment Diagnosis: Pain in right leg [M79.604]  Pain in left leg [M79.605]  Iliotibial band syndrome, right leg [M76.31]  Payor: VA MEDICARE / Plan: VA MEDICARE PART A & B / Product Type: Medicare /  Onset Date:> 1 month ago     Prior Hospitalization: see medical history Provider#: [id number]   Medications: Verified on Patient Summary List    Comorbidities: Diabetes, HTN, BMI, Incontinence   Prior Level of Function: The patient states that she had improved ease of standing, and transfers prior to onset. Visits from Start of Care: 13    Missed Visits: 0  Reporting Period : 3/5/19 to 3/25/19      Summary of Care:   1. The patient will improve FOTO score to 54 to maximize quality of life. Not met - 35 3/05/2019  Not met FOTO 48 (3/25/19)                 2. The patient will improve hip ABD to 4/5 MMT to maximize stability in stance. 4-/5 MMT left, 3+/5 MMT right 2019; Partially met  MMT hip abd left 4/5  right 3+/5 with pain (3/25/19)              3. The patient will improve negotiate stairs with a little bit of difficulty to maximize ease of stair negotiation.  Remains quite a bit of difficulty 3/05/2019; Met- pt negotiates stairs with a little bit of difficulty ascending and performs step-to pattern, descending reciprocally (3/25/19)              4. The patient will demonstrate negative 90/90 HS flexibility test to improve ease of ambulation. Improved to 27/29 degrees right/left 2019. ; improved 20/22, right/left (3/18/19);  Improved right 18* left 20* (3/25/19)   Pt has made some progress in PT towards improved LE flexibility and strength, improved ease with stair negotiation, and reports 60% subjective improvement since SOC. Pt cont's to report difficulty performing sit to stand due to pain and ascends stairs with step-to pattern with right hip pain.  Reviewed HEP for cont'd LE stretch/ strengthening, pt is prepared for D/C from PT at this time.        ASSESSMENT/RECOMMENDATIONS:  [x]Discontinue therapy: [x]Patient has reached or is progressing toward set goals      []Patient is non-compliant or has abdicated      []Due to lack of appreciable progress towards set Ul. Robert Shankar, PT 4/19/2019 2:25 PM

## 2019-05-18 ENCOUNTER — HOSPITAL ENCOUNTER (EMERGENCY)
Age: 69
Discharge: HOME | End: 2019-05-18
Attending: EMERGENCY MEDICINE
Payer: MEDICARE

## 2019-05-18 VITALS
RESPIRATION RATE: 20 BRPM | HEART RATE: 81 BPM | WEIGHT: 293 LBS | BODY MASS INDEX: 48.82 KG/M2 | DIASTOLIC BLOOD PRESSURE: 68 MMHG | HEIGHT: 65 IN | SYSTOLIC BLOOD PRESSURE: 136 MMHG | OXYGEN SATURATION: 97 % | TEMPERATURE: 97.7 F

## 2019-05-18 DIAGNOSIS — M25.551 PAIN OF RIGHT HIP JOINT: Primary | ICD-10-CM

## 2019-05-18 DIAGNOSIS — M54.50 ACUTE MIDLINE LOW BACK PAIN WITHOUT SCIATICA: ICD-10-CM

## 2019-05-18 PROCEDURE — 96372 THER/PROPH/DIAG INJ SC/IM: CPT

## 2019-05-18 PROCEDURE — 99281 EMR DPT VST MAYX REQ PHY/QHP: CPT

## 2019-05-18 PROCEDURE — 74011250636 HC RX REV CODE- 250/636: Performed by: EMERGENCY MEDICINE

## 2019-05-18 RX ORDER — NAPROXEN 375 MG/1
375 TABLET ORAL 2 TIMES DAILY WITH MEALS
Qty: 20 TAB | Refills: 0 | Status: SHIPPED | OUTPATIENT
Start: 2019-05-18

## 2019-05-18 RX ORDER — CYCLOBENZAPRINE HCL 10 MG
10 TABLET ORAL
Qty: 15 TAB | Refills: 0 | Status: SHIPPED | OUTPATIENT
Start: 2019-05-18

## 2019-05-18 RX ORDER — METFORMIN HYDROCHLORIDE 500 MG/1
500 TABLET ORAL 2 TIMES DAILY WITH MEALS
COMMUNITY

## 2019-05-18 RX ORDER — PREDNISONE 20 MG/1
60 TABLET ORAL
Qty: 18 TAB | Refills: 0 | Status: SHIPPED | OUTPATIENT
Start: 2019-05-18

## 2019-05-18 RX ORDER — KETOROLAC TROMETHAMINE 30 MG/ML
15 INJECTION, SOLUTION INTRAMUSCULAR; INTRAVENOUS
Status: COMPLETED | OUTPATIENT
Start: 2019-05-18 | End: 2019-05-18

## 2019-05-18 RX ADMIN — KETOROLAC TROMETHAMINE 15 MG: 30 INJECTION, SOLUTION INTRAMUSCULAR at 14:14

## 2019-05-18 NOTE — ED PROVIDER NOTES
70-year-old female presents for evaluation of ongoing right hip pain. She first noticed the onset of pain about a month and a half ago. No trauma. She was seen by her primary care physician and had outpatient x-rays which were negative for fracture. She underwent a course of physical therapy without much improvement in her pain. Discomfort is in the lateral aspect of the right hip. It is worse with weightbearing. Over the last week she has developed low back pain as well. There is some radiation of discomfort along the lateral aspect of the right thigh. She has no abdominal pain or urinary symptoms. Tylenol has been minimally effective at relief. Past Medical History:  
Diagnosis Date  Burning with urination  Diabetes ([address] prediabetes  Dyslipidemia  Essential hypertension  H/O cardiac catheterization 2003 Normal coronary anatomy  H/O echocardiogram 07/2017 EF 65%, mild concentric LVH, no significant valvular heart disease  Ill-defined condition High Cholesterol Past Surgical History:  
Procedure Laterality Date  COLONOSCOPY N/A 4/25/2017 COLONOSCOPY with polyp biopsy performed by Terrie Kayser, MD at [address] HX COLONOSCOPY    
 [address] Family History:  
Problem Relation Age of Onset  Stroke Mother  Heart Attack Father Social History Socioeconomic History  Marital status: SINGLE Spouse name: Not on file  Number of children: Not on file  Years of education: Not on file  Highest education level: Not on file Occupational History  Not on file Social Needs  Financial resource strain: Not on file  Food insecurity:  
  Worry: Not on file Inability: Not on file  Transportation needs:  
  Medical: Not on file Non-medical: Not on file Tobacco Use  Smoking status: Never Smoker  Smokeless tobacco: Never Used Substance and Sexual Activity  Alcohol use:  No  
  Drug use: No  
 Sexual activity: Not on file Lifestyle  Physical activity:  
  Days per week: Not on file Minutes per session: Not on file  Stress: Not on file Relationships  Social connections:  
  Talks on phone: Not on file Gets together: Not on file Attends Islam service: Not on file Active member of club or organization: Not on file Attends meetings of clubs or organizations: Not on file Relationship status: Not on file  Intimate partner violence:  
  Fear of current or ex partner: Not on file Emotionally abused: Not on file Physically abused: Not on file Forced sexual activity: Not on file Other Topics Concern  Not on file Social History Narrative  Not on file ALLERGIES: Patient has no known allergies. Review of Systems Constitutional: Negative for fever. Musculoskeletal: Positive for back pain and gait problem. Negative for neck stiffness. All other systems reviewed and are negative. Vitals:  
 05/18/19 1306 BP: 136/68 Pulse: 81 Resp: 20 Temp: 97.7 °F (36.5 °C) SpO2: 97% Weight: 145.2 kg (320 lb) Height: 5' 5\" (1.651 m) Physical Exam  
Constitutional: She is oriented to person, place, and time. She appears well-developed and well-nourished. No distress. HENT:  
Head: Normocephalic and atraumatic. Eyes: No scleral icterus. Cardiovascular: Normal rate. Pulmonary/Chest: Effort normal.  
Musculoskeletal: Local area of tenderness lateral aspect of the right hip just superior to the greater trochanter. There is minimal discomfort with internal and external rotation of the hip. There is mild tenderness to palpation over the posterior right buttock area with negative straight leg raise. Distal neurovascular is preserved in the right lower extremity. There is no point tenderness to palpation over the lumbar spine. Neurological: She is alert and oriented to person, place, and time. Skin: Skin is warm and dry. Psychiatric: She has a normal mood and affect. Nursing note and vitals reviewed. MDM Number of Diagnoses or Management Options Acute midline low back pain without sciatica:  
Pain of right hip joint:  
Diagnosis management comments: Impression: 35-year-old female with ongoing right hip pain associated with low back pain as well. The prior x-ray was reviewed per myself. Currently the location of the hip pain suggest that it may be secondary to bursitis. She has minimal discomfort with internal rotation of the hip suggesting the source of pain is likely outside the hip joint proper. We will provide her with trial of nonsteroidal medications. She may also get benefit from a course of prednisone and this will be prescribed currently. Muscle relaxant will provide to aid with low back pain and spasm. Last seen orthopedic referral will be provided given at this pain has been persistent for more than a month despite physical therapy Procedures Diagnosis: 1. Pain of right hip joint 2. Acute midline low back pain without sciatica 1. Review of the prior right hip film reveals mild to moderate arthritis with no acute bony abnormalities noted. 2.  Start naproxen 375 mg twice daily with food for pain and inflammation. 3.  Start Flexeril 10 mg every 8 hours as needed for back spasms. 4.  Prednisone taper 60 mg to be tapered over 9 days. 5.  Orthopedic follow-up for ongoing symptoms. 6.  Follow-up with primary care physician for recheck of this and ongoing medical issues as needed in the next 7 to 10 days. 7.  You were treated in the emergency department with Toradol 15 mg via intramuscular injection. Disposition: home Follow-up Information Follow up With Specialties Details Why Contact Info Anton Xiao MD Family Practice  As needed [address] [place] [address] [phone] Bernardino Vincent MD Orthopedic Surgery Schedule an appointment as soon as possible for a visit  [address] [facility] 
[phone] Patient's Medications Start Taking CYCLOBENZAPRINE (FLEXERIL) 10 MG TABLET    Take 1 Tab by mouth three (3) times daily as needed for Muscle Spasm(s). NAPROXEN (NAPROSYN) 375 MG TABLET    Take 1 Tab by mouth two (2) times daily (with meals). PREDNISONE (DELTASONE) 20 MG TABLET    Take 60 mg by mouth daily (with breakfast). 60 MG DAILY X 3 DAYS, THEN 40 MG DAILY X 3 DAYS, THEN 20 MG DAILY X 3 DAYS Continue Taking AMLODIPINE (NORVASC) 10 MG TABLET    Take 10 mg by mouth nightly. ASPIRIN DELAYED-RELEASE 81 MG TABLET    Take  by mouth daily. CHOLECALCIFEROL, VITAMIN D3, 1,000 UNIT CAP    Take  by mouth. LOSARTAN (COZAAR) 100 MG TABLET    Take 100 mg by mouth nightly. METFORMIN (GLUCOPHAGE) 500 MG TABLET    Take 500 mg by mouth two (2) times daily (with meals). ROSUVASTATIN (CRESTOR) 10 MG TABLET    Take 10 mg by mouth nightly. These Medications have changed No medications on file Stop Taking No medications on file

## 2019-05-18 NOTE — ED NOTES
Written and verbal discharge instructions given. Patient verbalizes understanding of same. Patient denies  further questions about treatment and discharge instructions. Left ED with patent airway and steady gait via W/C to car. Arm band removed shredded. Patient left ED with 3 RX

## 2019-05-18 NOTE — ED TRIAGE NOTES
Pt c/o right hip pain off and on for one month. Pain radiates down right leg and also c/o low back pain. Hx of sciatica a long time ago

## 2019-05-18 NOTE — DISCHARGE INSTRUCTIONS
1.  Review of the prior right hip film reveals mild to moderate arthritis with no acute bony abnormalities noted. 2.  Start naproxen 375 mg twice daily with food for pain and inflammation. 3.  Start Flexeril 10 mg every 8 hours as needed for back spasms. 4.  Prednisone taper 60 mg to be tapered over 9 days. 5.  Orthopedic follow-up for ongoing symptoms. 6.  Follow-up with primary care physician for recheck of this and ongoing medical issues as needed in the next 7 to 10 days. 7.  You were treated in the emergency department with Toradol 15 mg via intramuscular injection.

## 2019-07-22 ENCOUNTER — HOSPITAL ENCOUNTER (OUTPATIENT)
Age: 69
Discharge: HOME | End: 2019-07-22
Attending: FAMILY MEDICINE
Payer: MEDICARE

## 2019-07-22 DIAGNOSIS — M25.551 RIGHT HIP PAIN: ICD-10-CM

## 2019-07-22 PROCEDURE — 72195 MRI PELVIS W/O DYE: CPT

## 2019-07-22 PROCEDURE — 73721 MRI JNT OF LWR EXTRE W/O DYE: CPT

## 2019-12-03 ENCOUNTER — HOSPITAL ENCOUNTER (OUTPATIENT)
Dept: MAMMOGRAPHY | Age: 69
Discharge: HOME | End: 2019-12-03
Attending: FAMILY MEDICINE
Payer: MEDICARE

## 2019-12-03 DIAGNOSIS — Z12.31 VISIT FOR SCREENING MAMMOGRAM: ICD-10-CM

## 2019-12-03 PROCEDURE — 77067 SCR MAMMO BI INCL CAD: CPT

## 2021-01-21 ENCOUNTER — TRANSCRIBE ORDER (OUTPATIENT)
Dept: SCHEDULING | Age: 71
End: 2021-01-21

## 2021-01-21 DIAGNOSIS — Z12.31 ENCOUNTER FOR SCREENING MAMMOGRAM FOR BREAST CANCER: Primary | ICD-10-CM

## 2021-03-23 ENCOUNTER — HOSPITAL ENCOUNTER (OUTPATIENT)
Dept: MAMMOGRAPHY | Age: 71
Discharge: HOME | End: 2021-03-23
Attending: FAMILY MEDICINE
Payer: MEDICARE

## 2021-03-23 DIAGNOSIS — Z12.31 ENCOUNTER FOR SCREENING MAMMOGRAM FOR BREAST CANCER: ICD-10-CM

## 2021-03-23 PROCEDURE — 77063 BREAST TOMOSYNTHESIS BI: CPT

## 2021-11-08 ENCOUNTER — TRANSCRIBE ORDER (OUTPATIENT)
Dept: SCHEDULING | Age: 71
End: 2021-11-08

## 2021-11-08 DIAGNOSIS — R92.8 ABNORMAL MAMMOGRAM: Primary | ICD-10-CM

## 2021-11-16 ENCOUNTER — HOSPITAL ENCOUNTER (OUTPATIENT)
Dept: ULTRASOUND IMAGING | Age: 71
Discharge: HOME | End: 2021-11-16
Attending: FAMILY MEDICINE
Payer: MEDICARE

## 2021-11-16 ENCOUNTER — HOSPITAL ENCOUNTER (OUTPATIENT)
Dept: MAMMOGRAPHY | Age: 71
Discharge: HOME | End: 2021-11-16
Attending: FAMILY MEDICINE
Payer: MEDICARE

## 2021-11-16 DIAGNOSIS — R92.8 ABNORMAL MAMMOGRAM: ICD-10-CM

## 2021-11-16 PROCEDURE — 76642 ULTRASOUND BREAST LIMITED: CPT

## 2021-11-16 PROCEDURE — 77066 DX MAMMO INCL CAD BI: CPT

## 2022-03-18 PROBLEM — E78.5 DYSLIPIDEMIA: Status: ACTIVE | Noted: 2017-08-09

## 2022-03-19 PROBLEM — I11.9 HYPERTENSIVE HEART DISEASE WITHOUT HEART FAILURE: Status: ACTIVE | Noted: 2017-08-09

## 2022-03-19 PROBLEM — I10 ESSENTIAL HYPERTENSION: Status: ACTIVE | Noted: 2017-08-09

## 2022-03-19 PROBLEM — E66.01 MORBID OBESITY (HCC): Status: ACTIVE | Noted: 2017-08-09

## 2023-01-17 ENCOUNTER — TRANSCRIBE ORDER (OUTPATIENT)
Dept: SCHEDULING | Age: 73
End: 2023-01-17

## 2023-01-17 DIAGNOSIS — Z12.31 VISIT FOR SCREENING MAMMOGRAM: Primary | ICD-10-CM

## 2023-01-27 ENCOUNTER — TRANSCRIBE ORDER (OUTPATIENT)
Dept: SCHEDULING | Age: 73
End: 2023-01-27

## 2023-01-27 DIAGNOSIS — Z12.31 VISIT FOR SCREENING MAMMOGRAM: Primary | ICD-10-CM

## 2023-02-01 DIAGNOSIS — Z12.31 VISIT FOR SCREENING MAMMOGRAM: Primary | ICD-10-CM

## 2023-02-05 DIAGNOSIS — Z12.31 VISIT FOR SCREENING MAMMOGRAM: Primary | ICD-10-CM

## 2023-03-17 ENCOUNTER — HOSPITAL ENCOUNTER (OUTPATIENT)
Facility: HOSPITAL | Age: 73
End: 2023-03-17
Payer: MEDICARE

## 2023-03-17 DIAGNOSIS — Z12.31 VISIT FOR SCREENING MAMMOGRAM: ICD-10-CM

## 2023-03-17 PROCEDURE — 77067 SCR MAMMO BI INCL CAD: CPT

## 2023-04-25 RX ORDER — LATANOPROST 50 UG/ML
1 SOLUTION/ DROPS OPHTHALMIC
COMMUNITY
Start: 2022-11-10

## 2023-04-25 RX ORDER — ERGOCALCIFEROL 1.25 MG/1
50000 CAPSULE ORAL WEEKLY
COMMUNITY

## 2023-05-03 ENCOUNTER — ANESTHESIA EVENT (OUTPATIENT)
Facility: HOSPITAL | Age: 73
End: 2023-05-03
Payer: MEDICARE

## 2023-05-04 ENCOUNTER — HOSPITAL ENCOUNTER (OUTPATIENT)
Facility: HOSPITAL | Age: 73
Setting detail: OUTPATIENT SURGERY
Discharge: HOME | End: 2023-05-04
Attending: INTERNAL MEDICINE | Admitting: INTERNAL MEDICINE
Payer: MEDICARE

## 2023-05-04 ENCOUNTER — ANESTHESIA (OUTPATIENT)
Facility: HOSPITAL | Age: 73
End: 2023-05-04
Payer: MEDICARE

## 2023-05-04 VITALS
HEART RATE: 70 BPM | BODY MASS INDEX: 47.09 KG/M2 | RESPIRATION RATE: 17 BRPM | SYSTOLIC BLOOD PRESSURE: 129 MMHG | HEIGHT: 66 IN | TEMPERATURE: 97.8 F | WEIGHT: 293 LBS | DIASTOLIC BLOOD PRESSURE: 69 MMHG | OXYGEN SATURATION: 99 %

## 2023-05-04 LAB — GLUCOSE BLD STRIP.AUTO-MCNC: 108 MG/DL (ref 70–110)

## 2023-05-04 PROCEDURE — 2580000003 HC RX 258: Performed by: NURSE ANESTHETIST, CERTIFIED REGISTERED

## 2023-05-04 PROCEDURE — 6360000002 HC RX W HCPCS: Performed by: NURSE ANESTHETIST, CERTIFIED REGISTERED

## 2023-05-04 PROCEDURE — 2500000003 HC RX 250 WO HCPCS: Performed by: NURSE ANESTHETIST, CERTIFIED REGISTERED

## 2023-05-04 PROCEDURE — 7100000011 HC PHASE II RECOVERY - ADDTL 15 MIN: Performed by: INTERNAL MEDICINE

## 2023-05-04 PROCEDURE — 7100000010 HC PHASE II RECOVERY - FIRST 15 MIN: Performed by: INTERNAL MEDICINE

## 2023-05-04 PROCEDURE — 3700000001 HC ADD 15 MINUTES (ANESTHESIA): Performed by: INTERNAL MEDICINE

## 2023-05-04 PROCEDURE — 7100000000 HC PACU RECOVERY - FIRST 15 MIN: Performed by: INTERNAL MEDICINE

## 2023-05-04 PROCEDURE — 2709999900 HC NON-CHARGEABLE SUPPLY: Performed by: INTERNAL MEDICINE

## 2023-05-04 PROCEDURE — 3700000000 HC ANESTHESIA ATTENDED CARE: Performed by: INTERNAL MEDICINE

## 2023-05-04 PROCEDURE — 88305 TISSUE EXAM BY PATHOLOGIST: CPT

## 2023-05-04 PROCEDURE — 82962 GLUCOSE BLOOD TEST: CPT

## 2023-05-04 PROCEDURE — 3600007512: Performed by: INTERNAL MEDICINE

## 2023-05-04 PROCEDURE — 3600007502: Performed by: INTERNAL MEDICINE

## 2023-05-04 RX ORDER — LIDOCAINE HYDROCHLORIDE 10 MG/ML
1 INJECTION, SOLUTION EPIDURAL; INFILTRATION; INTRACAUDAL; PERINEURAL
Status: DISCONTINUED | OUTPATIENT
Start: 2023-05-04 | End: 2023-05-04 | Stop reason: HOSPADM

## 2023-05-04 RX ORDER — ONDANSETRON 2 MG/ML
4 INJECTION INTRAMUSCULAR; INTRAVENOUS
Status: DISCONTINUED | OUTPATIENT
Start: 2023-05-04 | End: 2023-05-04 | Stop reason: HOSPADM

## 2023-05-04 RX ORDER — SODIUM CHLORIDE 0.9 % (FLUSH) 0.9 %
5-40 SYRINGE (ML) INJECTION PRN
Status: DISCONTINUED | OUTPATIENT
Start: 2023-05-04 | End: 2023-05-04 | Stop reason: HOSPADM

## 2023-05-04 RX ORDER — FAMOTIDINE 20 MG/1
20 TABLET, FILM COATED ORAL ONCE
Status: DISCONTINUED | OUTPATIENT
Start: 2023-05-04 | End: 2023-05-04

## 2023-05-04 RX ORDER — PROPOFOL 10 MG/ML
INJECTION, EMULSION INTRAVENOUS PRN
Status: DISCONTINUED | OUTPATIENT
Start: 2023-05-04 | End: 2023-05-04 | Stop reason: SDUPTHER

## 2023-05-04 RX ORDER — LIDOCAINE HYDROCHLORIDE 20 MG/ML
INJECTION, SOLUTION EPIDURAL; INFILTRATION; INTRACAUDAL; PERINEURAL PRN
Status: DISCONTINUED | OUTPATIENT
Start: 2023-05-04 | End: 2023-05-04 | Stop reason: SDUPTHER

## 2023-05-04 RX ORDER — SODIUM CHLORIDE, SODIUM LACTATE, POTASSIUM CHLORIDE, CALCIUM CHLORIDE 600; 310; 30; 20 MG/100ML; MG/100ML; MG/100ML; MG/100ML
INJECTION, SOLUTION INTRAVENOUS CONTINUOUS
Status: DISCONTINUED | OUTPATIENT
Start: 2023-05-04 | End: 2023-05-04 | Stop reason: HOSPADM

## 2023-05-04 RX ADMIN — SODIUM CHLORIDE, POTASSIUM CHLORIDE, SODIUM LACTATE AND CALCIUM CHLORIDE: 600; 310; 30; 20 INJECTION, SOLUTION INTRAVENOUS at 11:49

## 2023-05-04 RX ADMIN — PROPOFOL 25 MG: 10 INJECTION, EMULSION INTRAVENOUS at 12:24

## 2023-05-04 RX ADMIN — PROPOFOL 100 MG: 10 INJECTION, EMULSION INTRAVENOUS at 12:17

## 2023-05-04 RX ADMIN — LIDOCAINE HYDROCHLORIDE 40 MG: 20 INJECTION, SOLUTION EPIDURAL; INFILTRATION; INTRACAUDAL; PERINEURAL at 12:17

## 2023-05-04 ASSESSMENT — PAIN - FUNCTIONAL ASSESSMENT: PAIN_FUNCTIONAL_ASSESSMENT: NONE - DENIES PAIN

## 2023-05-04 ASSESSMENT — ENCOUNTER SYMPTOMS: SHORTNESS OF BREATH: 1

## 2023-05-04 NOTE — ANESTHESIA PRE PROCEDURE
(If Applicable):  No results found for: LABABO, LABRH    Drug/Infectious Status (If Applicable):  No results found for: HIV, HEPCAB    COVID-19 Screening (If Applicable): No results found for: COVID19        Anesthesia Evaluation  Patient summary reviewed and Nursing notes reviewed  Airway: Mallampati: II  TM distance: >3 FB   Neck ROM: full  Mouth opening: > = 3 FB   Dental: normal exam         Pulmonary:   (+) shortness of breath: no interval change and chronic,                             Cardiovascular:  Exercise tolerance: poor (<4 METS),   (+) hypertension: no interval change and moderate,         Rhythm: regular  Rate: normal                    Neuro/Psych:   Negative Neuro/Psych ROS              GI/Hepatic/Renal:   (+) morbid obesity          Endo/Other:    (+) DiabetesType II DM, no interval change, , .                 Abdominal:   (+) obese,           Vascular: negative vascular ROS. Other Findings:           Anesthesia Plan      MAC     ASA 3       Induction: intravenous. Anesthetic plan and risks discussed with patient.                         Asia Liriano MD   5/4/2023

## 2023-05-04 NOTE — DISCHARGE INSTRUCTIONS
medicines. These can increase your chances of quitting for good. If you drink alcohol, limit how much you drink. Limit alcohol to 2 drinks a day for men and 1 drink a day for women. When should you call for help? Call your doctor now or seek immediate medical care if:    You have severe belly pain. Your stools are maroon or very bloody. Watch closely for changes in your health, and be sure to contact your doctor if:    You have a fever. You have nausea or vomiting. You have a change in bowel habits (new constipation or diarrhea). Your symptoms get worse or are not improving as expected. Where can you learn more? Go to http://www.woods.com/ and enter C571 to learn more about \"Colon Polyps: Care Instructions. \"  Current as of: June 6, 2022               Content Version: 13.6  © 2006-2023 Healthwise, el?. Care instructions adapted under license by [facility] [place] ([facility]). If you have questions about a medical condition or this instruction, always ask your healthcare professional. [place] [address] any warranty or liability for your use of this information. Colonoscopy: What to Expect at [address] [place] [address]  After a colonoscopy, you'll stay at the clinic until you wake up. Then you can go home. But you'll need to arrange for a ride. Your doctor will tell you when you can eat and do your other usual activities. Your doctor will talk to you about when you'll need your next colonoscopy. Your doctor can help you decide how often you need to be checked. This will depend on the results of your test and your risk for colorectal cancer. After the test, you may be bloated or have gas pains. You may need to pass gas. If a biopsy was done or a polyp was removed, you may have streaks of blood in your stool (feces) for a few days. Problems such as heavy rectal bleeding may not occur until several weeks after the test. This isn't common.  But it can happen after polyps

## 2023-05-04 NOTE — H&P
[phone]    Gastroenterology pre op History and Physical     Impression:   1. High risk colon cancer screening/Colon polyp surveillance exam      Plan:     1. Colonoscopy    Addendum: All lab tests orders pertaining to the procedure have been ordered by the anesthesia personnel and results will be addressed by the anesthesia team      Chief Complaint: high risk colon cancer screening exam.      HPI:  Faustino Carvalho is a 68 y.o. female who is being seen on consult for high risk colon cancer screening with colonoscopy.  There is a previous history of colon polyps, and the patient returns for a surveillence exam    PMH:   Past Medical History:   Diagnosis Date    Burning with urination     Diabetes ([facility] [address]     prediabetes    Dyslipidemia     Essential hypertension     H/O cardiac catheterization 2003    Normal coronary anatomy    H/O echocardiogram 07/2017    EF 65%, mild concentric LVH, no significant valvular heart disease    Ill-defined condition     High Cholesterol       PSH:   Past Surgical History:   Procedure Laterality Date    COLONOSCOPY      COLONOSCOPY N/A 4/25/2017    COLONOSCOPY with polyp biopsy performed by Tabby Christy MD at [address] [place] [address] (CERVIX STATUS UNKNOWN)         Social HX:   Social History     Socioeconomic History    Marital status: Single     Spouse name: Not on file    Number of children: Not on file    Years of education: Not on file    Highest education level: Not on file   Occupational History    Not on file   Tobacco Use    Smoking status: Never    Smokeless tobacco: Never   Vaping Use    Vaping Use: Never used   Substance and Sexual Activity    Alcohol use: No    Drug use: No    Sexual activity: Not on file   Other Topics Concern    Not on file   Social History Narrative    Not on file     Social Determinants of Health     Financial Resource Strain: Not on file   Food Insecurity: Not on file   Transportation Needs: Not on file   Physical Activity: Not on file

## 2023-05-05 NOTE — ANESTHESIA POSTPROCEDURE EVALUATION
Department of Anesthesiology  Postprocedure Note    Patient: Edna Cox  MRN: [id number]  YOB: 1950  Date of evaluation: 5/4/2023      Procedure Summary     Date: 05/04/23 Room / Location: [facility] ENDO 02 / [facility] ENDOSCOPY    Anesthesia Start: 9061 Anesthesia Stop: 0892    Procedure: COLONOSCOPY with polypectomy (Abdomen) Diagnosis:       Personal history of colonic polyps      Obesity (BMI 30-39. 9)      Elevated blood pressure reading      (Personal history of colonic polyps [Z86.010])      (Obesity (BMI 30-39. 9) [E66.9])      (Elevated blood pressure reading [R03.0])    Surgeons: Deanne Irwin MD Responsible Provider: Ervin Estrada MD    Anesthesia Type: MAC ASA Status: 3          Anesthesia Type: MAC    Jessica Phase I: Jessica Score: 10    Jessica Phase II: Jessica Score: 10      Anesthesia Post Evaluation    Patient location during evaluation: PACU  Patient participation: complete - patient participated  Level of consciousness: awake  Airway patency: patent  Nausea & Vomiting: no nausea  Complications: no  Cardiovascular status: blood pressure returned to baseline  Respiratory status: acceptable  Hydration status: euvolemic

## 2023-09-18 ENCOUNTER — APPOINTMENT (OUTPATIENT)
Facility: HOSPITAL | Age: 73
End: 2023-09-18
Payer: MEDICARE

## 2023-09-22 ENCOUNTER — HOSPITAL ENCOUNTER (OUTPATIENT)
Facility: HOSPITAL | Age: 73
Setting detail: RECURRING SERIES
Discharge: HOME | End: 2023-09-25
Payer: MEDICARE

## 2023-09-22 PROCEDURE — 97535 SELF CARE MNGMENT TRAINING: CPT

## 2023-09-22 PROCEDURE — 97162 PT EVAL MOD COMPLEX 30 MIN: CPT

## 2023-09-22 PROCEDURE — 97110 THERAPEUTIC EXERCISES: CPT

## 2023-09-22 NOTE — PROGRESS NOTES
[address] [facility] [address] [facility] QQ:[phone] Fx: [phone]    PLAN OF CARE/ Statement of Necessity for Physical Therapy Services           Patient name: Major Graft Start of Care: 2023   Referral source: Amelia Phillips MD : 1950    Medical Diagnosis: Right hip pain [M25.551]  Right shoulder pain [M25.511]       Onset Date:2023    Treatment Diagnosis: M25.551  RIGHT HIP PAIN  and M25.511  RIGHT SHOULDER PAIN                                     Prior Hospitalization: see medical history Provider#: [id number]   Medications: Verified on Patient Summary List     Comorbidities: morbid obesity; HTN;   Prior Level of Function: Able to perform daily activities and ADLs without pain    The Plan of Care and following information is based on the information from the initial evaluation. Assessment / key information:  68y.o. year old female presents with CC of insidious onset of right hip and shoulder pain that began in 2023. Signs and symptoms are consistent with right shoulder mechanical dysfunction and right hip weakness. Impairments noted today: decreased right shoulder AROM in flex, scap, JUDE, and FIR; decreased right shoulder strength in flex, scap, ER; decreased right hip abd/ext strength. Patient will benefit from physical therapy to address deficits, and ultimately to return patient to prior level of function. Evaluation Complexity:  History:  MEDIUM  Complexity : 1-2 comorbidities / personal factors will impact the outcome/ POC ; Examination:  MEDIUM Complexity : 3 Standardized tests and measures addressin body structure, function, activity limitation and / or participation in recreation  ;Presentation:  MEDIUM Complexity : Evolving with changing characteristics  ; Clinical Decision Making:  MEDIUM Complexity : FOTO score of 26-74 FOTO score = an established functional score where 100 = no

## 2023-09-22 NOTE — PROGRESS NOTES
PHYSICAL / OCCUPATIONAL THERAPY - DAILY TREATMENT NOTE (updated )    Patient Name: Gomez Thompson    Date: 2023    : 1950  Insurance: Payor: MEDICARE / Plan: MEDICARE PART A AND B / Product Type: *No Product type* /      Patient  verified Yes     Visit #   Current / Total 1 24   Time   In / Out 1033 1109   Pain   In / Out Hip 4 sh 4 Hip 6 sh 4   Subjective Functional Status/Changes: See eval   Changes to:  Meds, Allergies, Med Hx, Sx Hx? If yes, update Summary List no       TREATMENT AREA =  Right hip pain [M25.551]  Right shoulder pain [M25.511]    OBJECTIVE         Therapeutic Procedures: Tx Min Billable or 1:1 Min (if diff from Tx Min) Procedure, Rationale, Specifics   15  34696 Therapeutic Exercise (timed):  increase ROM, strength, coordination, balance, and proprioception to improve patient's ability to progress to PLOF and address remaining functional goals. (see flow sheet as applicable)     Details if applicable:       8  57014 Self Care/Home Management (timed):  improve patient knowledge and understanding of diagnosis/prognosis and physical therapy expectations, procedures and progression  to improve patient's ability to progress to PLOF and address remaining functional goals.   (see flow sheet as applicable)     Details if applicable:            Details if applicable:            Details if applicable:            Details if applicable:     21  [facility] Totals Reminder: bill using total billable min of TIMED therapeutic procedures (example: do not include dry needle or estim unattended, both untimed codes, in totals to left)  8-22 min = 1 unit; 23-37 min = 2 units; 38-52 min = 3 units; 53-67 min = 4 units; 68-82 min = 5 units   Total Total     TOTAL TREATMENT TIME:        36     [x]  Patient Education billed concurrently with other procedures   [x] Review HEP    [] Progressed/Changed HEP, detail:    [] Other detail:       Objective Information/Functional Measures/Assessment    See

## 2023-09-26 ENCOUNTER — HOSPITAL ENCOUNTER (OUTPATIENT)
Facility: HOSPITAL | Age: 73
Setting detail: RECURRING SERIES
Discharge: HOME | End: 2023-09-29
Payer: MEDICARE

## 2023-09-26 PROCEDURE — 97112 NEUROMUSCULAR REEDUCATION: CPT

## 2023-09-26 PROCEDURE — 97110 THERAPEUTIC EXERCISES: CPT

## 2023-09-26 NOTE — PROGRESS NOTES
PHYSICAL / OCCUPATIONAL THERAPY - DAILY TREATMENT NOTE (updated )    Patient Name: Ivy Bring    Date: 2023    : 1950  Insurance: Payor: MEDICARE / Plan: MEDICARE PART A AND B / Product Type: *No Product type* /      Patient  verified Yes     Visit #   Current / Total 2 24   Time   In / Out 1:10  1:51   Pain   In / Out 5 5   Subjective Functional Status/Changes: Patient stated she isn't sure she is doing the HEP right. Changes to: Allergies, Med Hx, Sx Hx?   no       TREATMENT AREA =  Right hip pain [M25.551]  Right shoulder pain [M25.511]    OBJECTIVE    Therapeutic Procedures: Tx Min Billable or 1:1 Min (if diff from Tx Min) Procedure, Rationale, Specifics   31 24 31989 Therapeutic Exercise (timed):  increase ROM, strength, coordination, balance, and proprioception to improve patient's ability to progress to PLOF and address remaining functional goals. (see flow sheet as applicable)    Details if applicable:       10 10 07059 Neuromuscular Re-Education (timed):  improve balance, coordination, kinesthetic sense, posture, core stability and proprioception to improve patient's ability to develop conscious control of individual muscles and awareness of position of extremities in order to progress to PLOF and address remaining functional goals.  (see flow sheet as applicable)    Details if applicable:            Details if applicable:           Details if applicable:            Details if applicable:     45 31 [facility] Totals Reminder: bill using total billable min of TIMED therapeutic procedures (example: do not include dry needle or estim unattended, both untimed codes, in totals to left)  8-22 min = 1 unit; 23-37 min = 2 units; 38-52 min = 3 units; 53-67 min = 4 units; 68-82 min = 5 units   Total Total     TOTAL TREATMENT TIME:        34     [x]  Patient Education billed concurrently with other procedures   [x] Review HEP    [] Progressed/Changed HEP, detail:    [] Other detail:

## 2023-09-29 ENCOUNTER — HOSPITAL ENCOUNTER (OUTPATIENT)
Facility: HOSPITAL | Age: 73
Setting detail: RECURRING SERIES
End: 2023-09-29
Payer: MEDICARE

## 2023-09-29 PROCEDURE — 97112 NEUROMUSCULAR REEDUCATION: CPT

## 2023-09-29 PROCEDURE — 97110 THERAPEUTIC EXERCISES: CPT

## 2023-09-29 NOTE — PROGRESS NOTES
PHYSICAL / OCCUPATIONAL THERAPY - DAILY TREATMENT NOTE (updated )    Patient Name: Marysol Tate    Date: 2023    : 1950  Insurance: Payor: MEDICARE / Plan: MEDICARE PART A AND B / Product Type: *No Product type* /      Patient  verified Yes     Visit #   Current / Total 3 24   Time   In / Out 11:10 12:00   Pain   In / Out 6/10 1/10 shoulder, 3/10 hip   Subjective Functional Status/Changes: The patient reports most pain of her right shoulder upon entry today. Changes to: Allergies, Med Hx, Sx Hx?   no       TREATMENT AREA =  Right hip pain [M25.551]  Right shoulder pain [M25.511]    OBJECTIVE    Modalities Rationale:     decrease edema, decrease inflammation, and decrease pain to improve patient's ability to progress to PLOF and address remaining functional goals. 10     min  unbilled [x]  Ice     []  Heat    location/position: Seated right shoulder   Skin assessment post-treatment (if applicable):    []  intact    []  redness- no adverse reaction                 []redness - adverse reaction:         Therapeutic Procedures: Tx Min Billable or 1:1 Min (if diff from Tx Min) Procedure, Rationale, Specifics   25  04966 Therapeutic Exercise (timed):  increase ROM, strength, coordination, balance, and proprioception to improve patient's ability to progress to PLOF and address remaining functional goals. (see flow sheet as applicable)    Details if applicable:       15  42964 Neuromuscular Re-Education (timed):  improve balance, coordination, kinesthetic sense, posture, core stability and proprioception to improve patient's ability to develop conscious control of individual muscles and awareness of position of extremities in order to progress to PLOF and address remaining functional goals.  (see flow sheet as applicable)    Details if applicable:     36  [facility] Totals Reminder: bill using total billable min of TIMED therapeutic procedures (example: do not include dry needle or estim unattended,

## 2023-10-02 ENCOUNTER — HOSPITAL ENCOUNTER (OUTPATIENT)
Facility: HOSPITAL | Age: 73
Setting detail: RECURRING SERIES
Discharge: HOME | End: 2023-10-05
Payer: MEDICARE

## 2023-10-02 PROCEDURE — 97112 NEUROMUSCULAR REEDUCATION: CPT

## 2023-10-02 PROCEDURE — 97110 THERAPEUTIC EXERCISES: CPT

## 2023-10-02 NOTE — PROGRESS NOTES
PHYSICAL / OCCUPATIONAL THERAPY - DAILY TREATMENT NOTE (updated )    Patient Name: Dawit Said    Date: 10/2/2023    : 1950  Insurance: Payor: MEDICARE / Plan: MEDICARE PART A AND B / Product Type: *No Product type* /      Patient  verified Yes     Visit #   Current / Total 4 24   Time   In / Out 1109 1200   Pain   In / Out 4 3   Subjective Functional Status/Changes: No new complaints. Changes to:  Meds, Allergies, Med Hx, Sx Hx? If yes, update Summary List no       TREATMENT AREA =  Right hip pain [M25.551]  Right shoulder pain [M25.511]    OBJECTIVE    Modalities Rationale:     decrease pain to improve patient's ability to progress to PLOF and address remaining functional goals. min [] Estim Unattended, type/location:                                      []  w/ice    []  w/heat    min [] Estim Attended, type/location:                                     []  w/US     []  w/ice    []  w/heat    []  TENS insruct      min []  Mechanical Traction: type/lbs                   []  pro   []  sup   []  int   []  cont    []  before manual    []  after manual    min []  Ultrasound, settings/location:      min []  Iontophoresis w/ dexamethasone, location:                                               []  take home patch       []  in clinic     10   min  unbilled []  Ice     [x]  Heat    location/position: Left s/l right shoulder    min []  Paraffin,  details:     min []  Vasopneumatic Device, press/temp:     min []  Lesli Belt / Jones Lefort: If using vaso (only need to measure limb vaso being performed on)      pre-treatment girth :       post-treatment girth :       measured at (landmark location) :      min []  Other:    Skin assessment post-treatment (if applicable):    []  intact    []  redness- no adverse reaction                 []redness - adverse reaction:         Therapeutic Procedures:   Tx Min Billable or 1:1 Min (if diff from Tx Min) Procedure, Rationale, Specifics   92 90570 Therapeutic

## 2023-10-05 ENCOUNTER — HOSPITAL ENCOUNTER (OUTPATIENT)
Facility: HOSPITAL | Age: 73
Setting detail: RECURRING SERIES
Discharge: HOME | End: 2023-10-08
Payer: MEDICARE

## 2023-10-05 PROCEDURE — 97110 THERAPEUTIC EXERCISES: CPT

## 2023-10-05 PROCEDURE — 97112 NEUROMUSCULAR REEDUCATION: CPT

## 2023-10-05 NOTE — PROGRESS NOTES
PHYSICAL / OCCUPATIONAL THERAPY - DAILY TREATMENT NOTE (updated )    Patient Name: Tracey Taylor    Date: 10/5/2023    : 1950  Insurance: Payor: MEDICARE / Plan: MEDICARE PART A AND B / Product Type: *No Product type* /      Patient  verified Yes     Visit #   Current / Total 5 24   Time   In / Out 1:50 2:42   Pain   In / Out Shoulder 4/10, hip 5/10 Shoulder 2/10, hip 3/10   Subjective Functional Status/Changes: Pt reports she does her exercises once a day at least.    Changes to: Allergies, Med Hx, Sx Hx?   no       TREATMENT AREA =  Right hip pain [M25.551]  Right shoulder pain [M25.511]    OBJECTIVE    Modalities Rationale:     decrease pain and increase tissue extensibility to improve patient's ability to progress to PLOF and address remaining functional goals. min [] Estim Unattended, type/location:                                      []  w/ice    []  w/heat    min [] Estim Attended, type/location:                                     []  w/US     []  w/ice    []  w/heat    []  TENS insruct      min []  Mechanical Traction: type/lbs                   []  pro   []  sup   []  int   []  cont    []  before manual    []  after manual    min []  Ultrasound, settings/location:      min []  Iontophoresis w/ dexamethasone, location:                                               []  take home patch       []  in clinic     10   min  unbilled []  Ice     [x]  Heat    location/position: Right hip and shoulder in sitting.     min []  Paraffin,  details:     min []  Vasopneumatic Device, press/temp:     min []  Radha Calvert / Jennifer Reagan: If using vaso (only need to measure limb vaso being performed on)      pre-treatment girth :       post-treatment girth :       measured at (landmark location) :      min []  Other:    Skin assessment post-treatment (if applicable):    []  intact    []  redness- no adverse reaction                 []redness - adverse reaction:         Therapeutic Procedures:   Tx Min Billable

## 2023-10-10 ENCOUNTER — HOSPITAL ENCOUNTER (OUTPATIENT)
Facility: HOSPITAL | Age: 73
Setting detail: RECURRING SERIES
Discharge: HOME | End: 2023-10-13
Payer: MEDICARE

## 2023-10-10 PROCEDURE — 97112 NEUROMUSCULAR REEDUCATION: CPT

## 2023-10-10 PROCEDURE — 97110 THERAPEUTIC EXERCISES: CPT

## 2023-10-10 NOTE — PROGRESS NOTES
PHYSICAL / OCCUPATIONAL THERAPY - DAILY TREATMENT NOTE (updated )    Patient Name: Roosevelt Dakin    Date: 10/10/2023    : 1950  Insurance: Payor: MEDICARE / Plan: MEDICARE PART A AND B / Product Type: *No Product type* /      Patient  verified Yes     Visit #   Current / Total 6 24   Time   In / Out 1150 1242   Pain   In / Out 4 5 hip; 4 shoulder   Subjective Functional Status/Changes: It's staying around a 4. Changes to:  Meds, Allergies, Med Hx, Sx Hx? If yes, update Summary List no       TREATMENT AREA =  Right hip pain [M25.551]  Right shoulder pain [M25.511]    OBJECTIVE    Modalities Rationale:     decrease pain to improve patient's ability to progress to PLOF and address remaining functional goals. 10   min  unbilled [x]  Ice     []  Heat    location/position: Left s/l:right hip and shoulder   Skin assessment post-treatment (if applicable):    [x]  intact    []  redness- no adverse reaction                 []redness - adverse reaction:         Therapeutic Procedures: Tx Min Billable or 1:1 Min (if diff from Tx Min) Procedure, Rationale, Specifics   32  18293 Therapeutic Exercise (timed):  increase ROM, strength, coordination, balance, and proprioception to improve patient's ability to progress to PLOF and address remaining functional goals. (see flow sheet as applicable)     Details if applicable:       10  70657 Neuromuscular Re-Education (timed):  improve balance, coordination, kinesthetic sense, posture, core stability and proprioception to improve patient's ability to develop conscious control of individual muscles and awareness of position of extremities in order to progress to PLOF and address remaining functional goals.  (see flow sheet as applicable)     Details if applicable:            Details if applicable:            Details if applicable:            Details if applicable:     43  [facility] Totals Reminder: bill using total billable min of TIMED therapeutic procedures

## 2023-10-12 ENCOUNTER — HOSPITAL ENCOUNTER (OUTPATIENT)
Facility: HOSPITAL | Age: 73
Setting detail: RECURRING SERIES
Discharge: HOME | End: 2023-10-15
Payer: MEDICARE

## 2023-10-12 PROCEDURE — 97110 THERAPEUTIC EXERCISES: CPT

## 2023-10-12 PROCEDURE — 97112 NEUROMUSCULAR REEDUCATION: CPT

## 2023-10-12 NOTE — PROGRESS NOTES
PHYSICAL / OCCUPATIONAL THERAPY - DAILY TREATMENT NOTE (updated )    Patient Name: Rebekah Devine    Date: 10/12/2023    : 1950  Insurance: Payor: MEDICARE / Plan: MEDICARE PART A AND B / Product Type: *No Product type* /      Patient  verified Yes     Visit #   Current / Total 7 24   Time   In / Out 12:30 1:20   Pain   In / Out Shoulder 3/10, Hip 4/10 1-2/10   Subjective Functional Status/Changes: Pt has been doing exercises at last once a day   Changes to: Allergies, Med Hx, Sx Hx?   no       TREATMENT AREA =  Right hip pain [M25.551]  Right shoulder pain [M25.511]    OBJECTIVE    Modalities Rationale:     decrease pain and increase tissue extensibility to improve patient's ability to progress to PLOF and address remaining functional goals. min [] Estim Unattended, type/location:                                      []  w/ice    []  w/heat    min [] Estim Attended, type/location:                                     []  w/US     []  w/ice    []  w/heat    []  TENS insruct      min []  Mechanical Traction: type/lbs                   []  pro   []  sup   []  int   []  cont    []  before manual    []  after manual    min []  Ultrasound, settings/location:      min []  Iontophoresis w/ dexamethasone, location:                                               []  take home patch       []  in clinic     10   min  unbilled [x]  Ice     []  Heat    location/position:     min []  Paraffin,  details:     min []  Vasopneumatic Device, press/temp:     min []  Brian Nail / Clorinda Pata: If using vaso (only need to measure limb vaso being performed on)      pre-treatment girth :       post-treatment girth :       measured at (landmark location) :      min []  Other:    Skin assessment post-treatment (if applicable):    []  intact    []  redness- no adverse reaction                 []redness - adverse reaction:         Therapeutic Procedures:   Tx Min Billable or 1:1 Min (if diff from [employer]) Procedure, Rationale,

## 2023-10-17 ENCOUNTER — HOSPITAL ENCOUNTER (OUTPATIENT)
Facility: HOSPITAL | Age: 73
Setting detail: RECURRING SERIES
Discharge: HOME | End: 2023-10-20
Payer: MEDICARE

## 2023-10-17 PROCEDURE — 97112 NEUROMUSCULAR REEDUCATION: CPT

## 2023-10-17 PROCEDURE — 97110 THERAPEUTIC EXERCISES: CPT

## 2023-10-17 NOTE — PROGRESS NOTES
PHYSICAL / OCCUPATIONAL THERAPY - DAILY TREATMENT NOTE (updated )    Patient Name: Pedro Mcguire    Date: 10/17/2023    : 1950  Insurance: Payor: MEDICARE / Plan: MEDICARE PART A AND B / Product Type: *No Product type* /      Patient  verified Yes     Visit #   Current / Total 8 24   Time   In / Out 1111 1205   Pain   In / Out 4-5 hip; 3 shoulder 3 hip; 3 shoulder   Subjective Functional Status/Changes: My shoulder is feeling better. Changes to:  Meds, Allergies, Med Hx, Sx Hx? If yes, update Summary List no       TREATMENT AREA =  Right hip pain [M25.551]  Right shoulder pain [M25.511]    OBJECTIVE    Modalities Rationale:     decrease pain and increase tissue extensibility to improve patient's ability to progress to PLOF and address remaining functional goals. min [] Estim Unattended, type/location:                                      []  w/ice    []  w/heat    min [] Estim Attended, type/location:                                     []  w/US     []  w/ice    []  w/heat    []  TENS insruct      min []  Mechanical Traction: type/lbs                   []  pro   []  sup   []  int   []  cont    []  before manual    []  after manual    min []  Ultrasound, settings/location:      min []  Iontophoresis w/ dexamethasone, location:                                               []  take home patch       []  in clinic   10     min  unbilled []  Ice     [x]  Heat    location/position: Left sidelying;     min []  Paraffin,  details:     min []  Vasopneumatic Device, press/temp:     min []  Ian Perez / Janie Jennifer: If using vaso (only need to measure limb vaso being performed on)      pre-treatment girth :       post-treatment girth :       measured at (landmark location) :      min []  Other:    Skin assessment post-treatment (if applicable):    []  intact    []  redness- no adverse reaction                 []redness - adverse reaction:         Therapeutic Procedures:   Tx Min Billable or 1:1 Min (if diff

## 2023-10-19 ENCOUNTER — HOSPITAL ENCOUNTER (OUTPATIENT)
Facility: HOSPITAL | Age: 73
Setting detail: RECURRING SERIES
End: 2023-10-19
Payer: MEDICARE

## 2023-10-19 ENCOUNTER — TELEPHONE (OUTPATIENT)
Facility: HOSPITAL | Age: 73
End: 2023-10-19

## 2023-10-24 ENCOUNTER — HOSPITAL ENCOUNTER (OUTPATIENT)
Facility: HOSPITAL | Age: 73
Setting detail: RECURRING SERIES
Discharge: HOME | End: 2023-10-27
Payer: MEDICARE

## 2023-10-24 PROCEDURE — 97110 THERAPEUTIC EXERCISES: CPT

## 2023-10-24 PROCEDURE — 97140 MANUAL THERAPY 1/> REGIONS: CPT

## 2023-10-24 PROCEDURE — 97112 NEUROMUSCULAR REEDUCATION: CPT

## 2023-10-24 NOTE — PROGRESS NOTES
[address] [facility] [address] [facility] - Ph: [phone]   Fx: [phone]    PHYSICAL THERAPY PROGRESS NOTE      Patient name: Selina Lucero Start of Care: 23   Referral source: Sky Don MD : 1950    Medical Diagnosis: Right hip pain [M25.551]  Right shoulder pain [M25.511]  Payor: MEDICARE / Plan: MEDICARE PART A AND B / Product Type: *No Product type* /  Onset Date:2023    Treatment Diagnosis:  M25.551  RIGHT HIP PAIN  and M25.511  RIGHT SHOULDER PAIN                                     Prior Hospitalization: see medical history Provider#: [id number]   Medications: Verified on Patient summary List   Comorbidities: morbid obesity; HTN;   Prior Level of Function: Able to perform daily activities and ADLs without pain  Visits from Start of Care: 9    Missed Visits: 1    Goals/Measure of Progress: To be achieved in 4 weeks:    1. I and compliant with HEP for self management of symptoms. IE:issued hEP  PN: Met reports compliance 1 time per day   2. Increase right shoulder AROM flexion to 130 deg to increase ease with ADLs. IE:120 deg  PN:160 goal met  3. Improve shoulder FOTO to 64 to indicate improved function with daily activities. IE:49  PN:50 10/24/23  4. Improve hip FOTO to 56 to indicate improved function with daily activities. IE:38  PN:43 10/24/23  5. Increase right AROM shoulder flex and scap to 150 deg to increase ease with reaching into cabinets. IE:120 flex; 115 scap  PN:140 flex; 115 scap 10/10/23  6. Increase right shoulder JUDE to C7 to increase ease with ADLs. IE: occiput  PN:C7 goal met 10/10/23  7. Increase right shoulder FIR to T10 to increase ease with ADLs. IE: iliac crest  PN:L5 progressing 10/10/23  8. Increase right hip abd and ext strength to 3+/5 to improve stability for house cleaning.    IE:3-/5 abd; 2+/5 ext  PN:3/5 abd 3-/5 ext 10/17/23       Summary of Care/ Key Functional

## 2023-10-24 NOTE — PROGRESS NOTES
PHYSICAL / OCCUPATIONAL THERAPY - DAILY TREATMENT NOTE (updated )    Patient Name: Zaida Hendricks    Date: 10/24/2023    : 1950  Insurance: Payor: MEDICARE / Plan: MEDICARE PART A AND B / Product Type: *No Product type* /      Patient  verified Yes     Visit #   Current / Total 9 24   Time   In / Out 1114 1216   Pain   In / Out 4 hip, 3 shoulder 2 shoulder; 4 hip   Subjective Functional Status/Changes: I feel like it's better. Changes to:  Meds, Allergies, Med Hx, Sx Hx? If yes, update Summary List no       TREATMENT AREA =  Right hip pain [M25.551]  Right shoulder pain [M25.511]    OBJECTIVE    Modalities Rationale:     decrease pain to improve patient's ability to progress to PLOF and address remaining functional goals. 10     min  unbilled []  Ice     [x]  Heat    location/position: Left sidelying; right shoulder and hip   Skin assessment post-treatment (if applicable):    []  intact    []  redness- no adverse reaction                 []redness - adverse reaction:         Therapeutic Procedures: Tx Min Billable or 1:1 Min (if diff from Tx Min) Procedure, Rationale, Specifics   35  09401 Therapeutic Exercise (timed):  increase ROM, strength, coordination, balance, and proprioception to improve patient's ability to progress to PLOF and address remaining functional goals. (see flow sheet as applicable)     Details if applicable:       10  78291 Neuromuscular Re-Education (timed):  improve balance, coordination, kinesthetic sense, posture, core stability and proprioception to improve patient's ability to develop conscious control of individual muscles and awareness of position of extremities in order to progress to PLOF and address remaining functional goals. (see flow sheet as applicable)     Details if applicable:     8  52400 Manual Therapy (timed):  reassessment of goals to improve patient's ability to progress to PLOF and address remaining functional goals.   The manual therapy

## 2023-10-26 ENCOUNTER — HOSPITAL ENCOUNTER (OUTPATIENT)
Facility: HOSPITAL | Age: 73
Setting detail: RECURRING SERIES
Discharge: HOME | End: 2023-10-29
Payer: MEDICARE

## 2023-10-26 PROCEDURE — 97110 THERAPEUTIC EXERCISES: CPT

## 2023-10-26 PROCEDURE — 97140 MANUAL THERAPY 1/> REGIONS: CPT

## 2023-10-26 PROCEDURE — 97112 NEUROMUSCULAR REEDUCATION: CPT

## 2023-10-26 NOTE — PROGRESS NOTES
strength deficits, analyze and address soft tissue restrictions, and analyze and cue for proper movement patterns to address functional deficits and attain remaining goals. Progress toward goals / Updated goals:  []  See Progress Note/Recertification    1. I and compliant with HEP for self management of symptoms. IE:issued hEP  PN: Met reports compliance 1 time per day   2. Increase right shoulder AROM flexion to 130 deg to increase ease with ADLs. IE:120 deg  PN:160 goal met  3. Improve shoulder FOTO to 64 to indicate improved function with daily activities. IE:49  PN:50 10/24/23  4. Improve hip FOTO to 56 to indicate improved function with daily activities. IE:38  PN:43 10/24/23  5. Increase right AROM shoulder flex and scap to 150 deg to increase ease with reaching into cabinets. IE:120 flex; 115 scap  PN:140 flex; 115 scap 10/10/23  6. Increase right shoulder JUDE to C7 to increase ease with ADLs. IE: occiput  PN:C7 goal met 10/10/23  7. Increase right shoulder FIR to T10 to increase ease with ADLs. IE: iliac crest  PN:L5 progressing 10/10/23  8. Increase right hip abd and ext strength to 3+/5 to improve stability for house cleaning.    IE:3-/5 abd; 2+/5 ext  PN:3/5 abd 3-/5 ext 10/17/23    PLAN  Yes  Continue plan of care  []  Upgrade activities as tolerated  []  Discharge due to :  []  Other:    Geeta Guevara [facility], [facility]    10/26/2023    12:48 PM    Future Appointments   Date Time Provider [address] [place] [address]   10/30/2023  2:30 PM Justin Talbert, [facility]   11/1/2023 11:10 AM Jenn Bull, PT [facility]   11/7/2023 11:10 AM Roel Chow, PTA [facility]   11/9/2023 11:10 AM Jenn Bull, PT [facility]   11/14/2023 11:10 AM Tracey Mark, PT [facility]   11/16/2023 11:10 AM Jenn Bull, PT [facility]   11/21/2023 11:10 AM Jenn Bull, [facility]

## 2023-10-30 ENCOUNTER — HOSPITAL ENCOUNTER (OUTPATIENT)
Facility: HOSPITAL | Age: 73
Setting detail: RECURRING SERIES
Discharge: HOME | End: 2023-11-02
Payer: MEDICARE

## 2023-10-30 PROCEDURE — 97140 MANUAL THERAPY 1/> REGIONS: CPT

## 2023-10-30 PROCEDURE — 97112 NEUROMUSCULAR REEDUCATION: CPT

## 2023-10-30 PROCEDURE — 97110 THERAPEUTIC EXERCISES: CPT

## 2023-10-30 NOTE — PROGRESS NOTES
PHYSICAL / OCCUPATIONAL THERAPY - DAILY TREATMENT NOTE (updated )    Patient Name: Dawit Said    Date: 10/30/2023    : 1950  Insurance: Payor: MEDICARE / Plan: MEDICARE PART A AND B / Product Type: *No Product type* /      Patient  verified Yes     Visit #   Current / Total 11 24   Time   In / Out 2:33 3:23   Pain   In / Out 2/10 shoulder, 4/10 hip 2/10 shoulder, /10 hip   Subjective Functional Status/Changes: \"It's always that hip. \"   Changes to: Allergies, Med Hx, Sx Hx?   no       TREATMENT AREA =  Right hip pain [M25.551]  Right shoulder pain [M25.511]    OBJECTIVE  Modalities Rationale:     decrease pain to improve patient's ability to progress to PLOF and address remaining functional goals. 10    min  unbilled []  Ice     [x]  Heat    location/position: S/l: right shoulder/ hip   Skin assessment post-treatment (if applicable):    []  intact    []  redness- no adverse reaction                 []redness - adverse reaction:         Therapeutic Procedures: Tx Min Billable or 1:1 Min (if diff from Tx Min) Procedure, Rationale, Specifics   20  17215 Therapeutic Exercise (timed):  increase ROM, strength, coordination, balance, and proprioception to improve patient's ability to progress to PLOF and address remaining functional goals. (see flow sheet as applicable)    Details if applicable:       12  17300 Neuromuscular Re-Education (timed):  improve balance, coordination, kinesthetic sense, posture, core stability and proprioception to improve patient's ability to develop conscious control of individual muscles and awareness of position of extremities in order to progress to PLOF and address remaining functional goals. (see flow sheet as applicable)    Details if applicable:     8  95724 Manual Therapy (timed):  decrease pain, increase ROM, and increase tissue extensibility to improve patient's ability to progress to PLOF and address remaining functional goals.   The manual

## 2023-11-01 ENCOUNTER — HOSPITAL ENCOUNTER (OUTPATIENT)
Facility: HOSPITAL | Age: 73
Setting detail: RECURRING SERIES
Discharge: HOME | End: 2023-11-04
Payer: MEDICARE

## 2023-11-01 PROCEDURE — 97112 NEUROMUSCULAR REEDUCATION: CPT

## 2023-11-01 PROCEDURE — 97110 THERAPEUTIC EXERCISES: CPT

## 2023-11-01 NOTE — PROGRESS NOTES
Procedure, Rationale, Specifics   25  47081 Therapeutic Exercise (timed):  increase ROM, strength, coordination, balance, and proprioception to improve patient's ability to progress to PLOF and address remaining functional goals. (see flow sheet as applicable)    Details if applicable:       15  93197 Neuromuscular Re-Education (timed):  improve balance, coordination, kinesthetic sense, posture, core stability and proprioception to improve patient's ability to develop conscious control of individual muscles and awareness of position of extremities in order to progress to PLOF and address remaining functional goals. (see flow sheet as applicable)    Details if applicable:            Details if applicable:           Details if applicable:            Details if applicable:     36  [facility] Totals Reminder: bill using total billable min of TIMED therapeutic procedures (example: do not include dry needle or estim unattended, both untimed codes, in totals to left)  8-22 min = 1 unit; 23-37 min = 2 units; 38-52 min = 3 units; 53-67 min = 4 units; 68-82 min = 5 units   Total Total     TOTAL TREATMENT TIME:        50     [x]  Patient Education billed concurrently with other procedures   [x] Review HEP    [] Progressed/Changed HEP, detail:    [] Other detail:       Objective Information/Functional Measures/Assessment    Added bridging and SKTC with towel assist to improve muscle activation and ROM. Pt tolerated all exercises with report of no discomfort. Session completed with [facility] on shoulder and hip to decrease soreness.      Patient will continue to benefit from skilled PT / OT services to modify and progress therapeutic interventions, analyze and address functional mobility deficits, analyze and address ROM deficits, analyze and address strength deficits, analyze and address soft tissue restrictions, analyze and cue for proper movement patterns, analyze and modify for postural abnormalities, and instruct in home and community

## 2023-11-03 ENCOUNTER — APPOINTMENT (OUTPATIENT)
Facility: HOSPITAL | Age: 73
End: 2023-11-03
Payer: MEDICARE

## 2023-11-07 ENCOUNTER — HOSPITAL ENCOUNTER (OUTPATIENT)
Facility: HOSPITAL | Age: 73
Setting detail: RECURRING SERIES
Discharge: HOME | End: 2023-11-10
Payer: MEDICARE

## 2023-11-07 PROCEDURE — 97110 THERAPEUTIC EXERCISES: CPT

## 2023-11-07 PROCEDURE — 97112 NEUROMUSCULAR REEDUCATION: CPT

## 2023-11-07 NOTE — PROGRESS NOTES
PHYSICAL / OCCUPATIONAL THERAPY - DAILY TREATMENT NOTE (updated )    Patient Name: Ivy Bring    Date: 2023    : 1950  Insurance: Payor: MEDICARE / Plan: MEDICARE PART A AND B / Product Type: *No Product type* /      Patient  verified Yes     Visit #   Current / Total 13 24   Time   In / Out 11:10 11:58   Pain   In / Out Sh 2/10; hip 6/10 Sh 2/10; hip 6/10   Subjective Functional Status/Changes: Pt repots her shoulder is doing better but her hip is still bothering her. Changes to: Allergies, Med Hx, Sx Hx?   no       TREATMENT AREA =  Right hip pain [M25.551]  Right shoulder pain [M25.511]    OBJECTIVE    Modalities Rationale:     decrease inflammation and decrease pain to improve patient's ability to progress to PLOF and address remaining functional goals. min [] Estim Unattended, type/location:                                      []  w/ice    []  w/heat    min [] Estim Attended, type/location:                                     []  w/US     []  w/ice    []  w/heat    []  TENS insruct      min []  Mechanical Traction: type/lbs                   []  pro   []  sup   []  int   []  cont    []  before manual    []  after manual    min []  Ultrasound, settings/location:      min []  Iontophoresis w/ dexamethasone, location:                                               []  take home patch       []  in clinic     10   min  unbilled []  Ice     [x]  Heat    location/position: Left sidelying/ right shoulder/hip    min []  Paraffin,  details:     min []  Vasopneumatic Device, press/temp:     min []  Veronica Salon / Sayda Overcast: If using vaso (only need to measure limb vaso being performed on)      pre-treatment girth :       post-treatment girth :       measured at (landmark location) :      min []  Other:    Skin assessment post-treatment (if applicable):    []  intact    []  redness- no adverse reaction                 []redness - adverse reaction:         Therapeutic Procedures:   Tx Min

## 2023-11-09 ENCOUNTER — APPOINTMENT (OUTPATIENT)
Facility: HOSPITAL | Age: 73
End: 2023-11-09
Payer: MEDICARE

## 2023-11-10 ENCOUNTER — HOSPITAL ENCOUNTER (OUTPATIENT)
Facility: HOSPITAL | Age: 73
Setting detail: RECURRING SERIES
Discharge: HOME | End: 2023-11-13
Payer: MEDICARE

## 2023-11-10 PROCEDURE — 97110 THERAPEUTIC EXERCISES: CPT

## 2023-11-10 PROCEDURE — 97112 NEUROMUSCULAR REEDUCATION: CPT

## 2023-11-10 NOTE — PROGRESS NOTES
PHYSICAL / OCCUPATIONAL THERAPY - DAILY TREATMENT NOTE (updated )    Patient Name: Nelly Campoverde    Date: 11/10/2023    : 1950  Insurance: Payor: MEDICARE / Plan: MEDICARE PART A AND B / Product Type: *No Product type* /      Patient  verified Yes     Visit #   Current / Total 14 24   Time   In / Out 9:50 10:40   Pain   In / Out Sh 2/10, hip 5/10 Sh 1/10, hip 4/10   Subjective Functional Status/Changes: Pt reports she had a long drive this week and it aggravated her pain. Changes to: Allergies, Med Hx, Sx Hx?   no       TREATMENT AREA =  Right hip pain [M25.551]  Right shoulder pain [M25.511]    OBJECTIVE    Modalities Rationale:     decrease pain and increase tissue extensibility to improve patient's ability to progress to PLOF and address remaining functional goals. min [] Estim Unattended, type/location:                                      []  w/ice    []  w/heat    min [] Estim Attended, type/location:                                     []  w/US     []  w/ice    []  w/heat    []  TENS insruct      min []  Mechanical Traction: type/lbs                   []  pro   []  sup   []  int   []  cont    []  before manual    []  after manual    min []  Ultrasound, settings/location:      min []  Iontophoresis w/ dexamethasone, location:                                               []  take home patch       []  in clinic     10   min  unbilled []  Ice     [x]  Heat    location/position: Right shoulder and hip in left side lying. min []  Paraffin,  details:     min []  Vasopneumatic Device, press/temp:     min []  Eldonna Flight / Reola Villar: If using vaso (only need to measure limb vaso being performed on)      pre-treatment girth :       post-treatment girth :       measured at (landmark location) :      min []  Other:    Skin assessment post-treatment (if applicable):    []  intact    []  redness- no adverse reaction                 []redness - adverse reaction:         Therapeutic Procedures:   Tx

## 2023-11-14 ENCOUNTER — HOSPITAL ENCOUNTER (OUTPATIENT)
Facility: HOSPITAL | Age: 73
Setting detail: RECURRING SERIES
Discharge: HOME | End: 2023-11-17
Payer: MEDICARE

## 2023-11-14 PROCEDURE — 97112 NEUROMUSCULAR REEDUCATION: CPT

## 2023-11-14 PROCEDURE — 97110 THERAPEUTIC EXERCISES: CPT

## 2023-11-14 NOTE — PROGRESS NOTES
Therapeutic Procedures: Tx Min Billable or 1:1 Min (if diff from Tx Min) Procedure, Rationale, Specifics   33 28 [id number] Therapeutic Exercise (timed):  increase ROM, strength, coordination, balance, and proprioception to improve patient's ability to progress to PLOF and address remaining functional goals. (see flow sheet as applicable)    Details if applicable:       10 10 81750 Neuromuscular Re-Education (timed):  improve balance, coordination, kinesthetic sense, posture, core stability and proprioception to improve patient's ability to develop conscious control of individual muscles and awareness of position of extremities in order to progress to PLOF and address remaining functional goals. (see flow sheet as applicable)    Details if applicable:            Details if applicable:           Details if applicable:            Details if applicable:     38 40 [facility] Totals Reminder: bill using total billable min of TIMED therapeutic procedures (example: do not include dry needle or estim unattended, both untimed codes, in totals to left)  8-22 min = 1 unit; 23-37 min = 2 units; 38-52 min = 3 units; 53-67 min = 4 units; 68-82 min = 5 units   Total Total     TOTAL TREATMENT TIME:        53     [x]  Patient Education billed concurrently with other procedures   [x] Review HEP    [] Progressed/Changed HEP, detail:    [] Other detail:       Objective Information/Functional Measures/Assessment    Therapist provided cues for correct technique and muscle activation with exercises. Initiated seated Hip ER/IR isometrics to focus on activation and stability. Patient able to perform sit to stand transfers without UE assist with table elevated. Patient reported no increase in pain at end of the session.      Patient will continue to benefit from skilled PT / OT services to modify and progress therapeutic interventions, analyze and address functional mobility deficits, analyze and address ROM deficits, analyze and address strength

## 2023-11-16 ENCOUNTER — HOSPITAL ENCOUNTER (OUTPATIENT)
Facility: HOSPITAL | Age: 73
Setting detail: RECURRING SERIES
Discharge: HOME | End: 2023-11-19
Payer: MEDICARE

## 2023-11-16 PROCEDURE — 97112 NEUROMUSCULAR REEDUCATION: CPT

## 2023-11-16 PROCEDURE — 97110 THERAPEUTIC EXERCISES: CPT

## 2023-11-16 NOTE — PROGRESS NOTES
PHYSICAL / OCCUPATIONAL THERAPY - DAILY TREATMENT NOTE (updated )    Patient Name: Rafia Daily    Date: 2023    : 1950  Insurance: Payor: MEDICARE / Plan: MEDICARE PART A AND B / Product Type: *No Product type* /      Patient  verified Yes     Visit #   Current / Total 16 24   Time   In / Out 11:10 12:00   Pain   In / Out Sh 2/10, hip 5/10 Sh 2/10, Hip 4/10   Subjective Functional Status/Changes: Pt reports pain has been the same. Changes to: Allergies, Med Hx, Sx Hx?   no       TREATMENT AREA =  Right hip pain [M25.551]  Right shoulder pain [M25.511]    OBJECTIVE    Modalities Rationale:     decrease pain and increase tissue extensibility to improve patient's ability to progress to PLOF and address remaining functional goals. min [] Estim Unattended, type/location:                                      []  w/ice    []  w/heat    min [] Estim Attended, type/location:                                     []  w/US     []  w/ice    []  w/heat    []  TENS insruct      min []  Mechanical Traction: type/lbs                   []  pro   []  sup   []  int   []  cont    []  before manual    []  after manual    min []  Ultrasound, settings/location:      min []  Iontophoresis w/ dexamethasone, location:                                               []  take home patch       []  in clinic     10   min  unbilled []  Ice     [x]  Heat    location/position: Right hip and shoulder in left side lying. min []  Paraffin,  details:     min []  Vasopneumatic Device, press/temp:     min []  Mick Turner / Nasim Sarah: If using vaso (only need to measure limb vaso being performed on)      pre-treatment girth :       post-treatment girth :       measured at (landmark location) :      min []  Other:    Skin assessment post-treatment (if applicable):    []  intact    []  redness- no adverse reaction                 []redness - adverse reaction:         Therapeutic Procedures:   Tx Min Billable or 1:1 Min (if diff

## 2023-11-21 ENCOUNTER — HOSPITAL ENCOUNTER (OUTPATIENT)
Facility: HOSPITAL | Age: 73
Setting detail: RECURRING SERIES
Discharge: HOME | End: 2023-11-24
Payer: MEDICARE

## 2023-11-21 PROCEDURE — 97535 SELF CARE MNGMENT TRAINING: CPT

## 2023-11-21 PROCEDURE — 97112 NEUROMUSCULAR REEDUCATION: CPT

## 2023-11-21 PROCEDURE — 97110 THERAPEUTIC EXERCISES: CPT

## 2023-11-21 NOTE — PROGRESS NOTES
[facility]  [address] [place] [address] [address]  [facility]  [phone] fax    Physical Therapy Discharge Summary  Patient name: Neelam Dennis Start of Care: 2023   Referral source: Betty Cain MD : 1950   Medical/Treatment Diagnosis: Right hip pain [M25.551]  Right shoulder pain [M25.511]  Payor: MEDICARE / Plan: MEDICARE PART A AND B / Product Type: *No Product type* /  Onset Date:2023     Prior Hospitalization: see medical history Provider#: [id number]   Medications: Verified on Patient Summary List    Comorbidities: morbid obesity; HTN;   Prior Level of Function:Able to perform daily activities and ADLs without pain    Visits from Start of Care: 17    Missed Visits: 0    Reporting Period : 23 to 2023    Goals/Measure of Progress:    1. I and compliant with HEP for self management of symptoms. IE:issued hEP  PN: Met reports compliance 1 time per day   2. Increase right shoulder AROM flexion to 130 deg to increase ease with ADLs. IE:120 deg  PN:160 goal met  3. Improve shoulder FOTO to 64 to indicate improved function with daily activities. IE:49  PN:50 10/24/23  11/21/23: improved 63  4. Improve hip FOTO to 56 to indicate improved function with daily activities. IE:38  PN:43   23: 43. No change. 5. Increase right AROM shoulder flex and scap to 150 deg to increase ease with reaching into cabinets. IE:120 flex; 115 scap  PN: 140 flex; 115 scap   23: 145 flex; 115 scap  6. Increase right shoulder JUDE to C7 to increase ease with ADLs. IE: occiput  PN: C7 goal met 10/10/23  7. Increase right shoulder FIR to T10 to increase ease with ADLs. IE: iliac crest  PN: L5 progressing   Current: remains at L5 10/30/2023  11/21/23: remains at L5 10/30/2023  8. Increase right hip abd and ext strength to 3+/5 to improve stability for house cleaning.    IE:3-/5 abd; 2+/5 ext  PN: 3/5 abd 3-/5 ext 10/17/23  Current: no
[]redness - adverse reaction:         Therapeutic Procedures: Tx Min Billable or 1:1 Min (if diff from Tx Min) Procedure, Rationale, Specifics   25  62586 Therapeutic Exercise (timed):  increase ROM, strength, coordination, balance, and proprioception to improve patient's ability to progress to PLOF and address remaining functional goals. (see flow sheet as applicable)    Details if applicable:       15  06064 Neuromuscular Re-Education (timed):  improve balance, coordination, kinesthetic sense, posture, core stability and proprioception to improve patient's ability to develop conscious control of individual muscles and awareness of position of extremities in order to progress to PLOF and address remaining functional goals. (see flow sheet as applicable)    Details if applicable:     15  12144 Self Care/Home Management (timed):  improve patient knowledge and understanding of pain reducing techniques, diagnosis/prognosis, physical therapy expectations, procedures and progression, and HEP  to improve patient's ability to progress to PLOF and address remaining functional goals.   (see flow sheet as applicable)     Details if applicable:           Details if applicable:            Details if applicable:     54  [facility] Totals Reminder: bill using total billable min of TIMED therapeutic procedures (example: do not include dry needle or estim unattended, both untimed codes, in totals to left)  8-22 min = 1 unit; 23-37 min = 2 units; 38-52 min = 3 units; 53-67 min = 4 units; 68-82 min = 5 units   Total Total     TOTAL TREATMENT TIME:        60     [x]  Patient Education billed concurrently with other procedures   [x] Review HEP    [] Progressed/Changed HEP, detail:    [] Other detail:       Objective Information/Functional Measures/Assessment    The patient has completed a total 17 visits this date for right shoulder and hip pain and showed good improvement in strength, flexibility, ROM and pain goals and met highest

## 2024-05-02 ENCOUNTER — TRANSCRIBE ORDERS (OUTPATIENT)
Facility: HOSPITAL | Age: 74
End: 2024-05-02

## 2024-05-02 DIAGNOSIS — Z12.31 VISIT FOR SCREENING MAMMOGRAM: Primary | ICD-10-CM

## 2024-05-14 ENCOUNTER — HOSPITAL ENCOUNTER (OUTPATIENT)
Facility: HOSPITAL | Age: 74
Discharge: HOME | End: 2024-05-17
Payer: MEDICARE

## 2024-05-14 DIAGNOSIS — Z12.31 VISIT FOR SCREENING MAMMOGRAM: ICD-10-CM

## 2024-05-14 PROCEDURE — 77063 BREAST TOMOSYNTHESIS BI: CPT

## 2024-12-14 ENCOUNTER — HOSPITAL ENCOUNTER (EMERGENCY)
Facility: HOSPITAL | Age: 74
Discharge: HOME | End: 2024-12-14
Payer: MEDICARE

## 2024-12-14 VITALS
SYSTOLIC BLOOD PRESSURE: 148 MMHG | OXYGEN SATURATION: 97 % | DIASTOLIC BLOOD PRESSURE: 74 MMHG | RESPIRATION RATE: 20 BRPM | TEMPERATURE: 98.1 F | BODY MASS INDEX: 47.09 KG/M2 | WEIGHT: 293 LBS | HEIGHT: 66 IN | HEART RATE: 83 BPM

## 2024-12-14 DIAGNOSIS — M25.511 ACUTE PAIN OF RIGHT SHOULDER: ICD-10-CM

## 2024-12-14 DIAGNOSIS — M54.16 LUMBAR BACK PAIN WITH RADICULOPATHY AFFECTING RIGHT LOWER EXTREMITY: Primary | ICD-10-CM

## 2024-12-14 PROCEDURE — 6360000002 HC RX W HCPCS: Performed by: HEALTH CARE PROVIDER

## 2024-12-14 PROCEDURE — 96372 THER/PROPH/DIAG INJ SC/IM: CPT

## 2024-12-14 PROCEDURE — 99284 EMERGENCY DEPT VISIT MOD MDM: CPT

## 2024-12-14 PROCEDURE — 6370000000 HC RX 637 (ALT 250 FOR IP): Performed by: HEALTH CARE PROVIDER

## 2024-12-14 RX ORDER — LIDOCAINE 4 G/G
2 PATCH TOPICAL
Status: DISCONTINUED | OUTPATIENT
Start: 2024-12-14 | End: 2024-12-14 | Stop reason: HOSPADM

## 2024-12-14 RX ORDER — KETOROLAC TROMETHAMINE 15 MG/ML
15 INJECTION, SOLUTION INTRAMUSCULAR; INTRAVENOUS ONCE
Status: COMPLETED | OUTPATIENT
Start: 2024-12-14 | End: 2024-12-14

## 2024-12-14 RX ADMIN — KETOROLAC TROMETHAMINE 15 MG: 15 INJECTION, SOLUTION INTRAMUSCULAR; INTRAVENOUS at 13:54

## 2024-12-14 ASSESSMENT — ENCOUNTER SYMPTOMS
COUGH: 0
CHEST TIGHTNESS: 0
ABDOMINAL PAIN: 0
VOMITING: 0
DIARRHEA: 0
SHORTNESS OF BREATH: 0
NAUSEA: 0

## 2024-12-14 ASSESSMENT — PAIN - FUNCTIONAL ASSESSMENT: PAIN_FUNCTIONAL_ASSESSMENT: 0-10

## 2024-12-14 ASSESSMENT — PAIN SCALES - GENERAL: PAINLEVEL_OUTOF10: 8

## 2024-12-14 ASSESSMENT — LIFESTYLE VARIABLES
HOW MANY STANDARD DRINKS CONTAINING ALCOHOL DO YOU HAVE ON A TYPICAL DAY: PATIENT DOES NOT DRINK
HOW MANY STANDARD DRINKS CONTAINING ALCOHOL DO YOU HAVE ON A TYPICAL DAY: PATIENT DOES NOT DRINK
HOW OFTEN DO YOU HAVE A DRINK CONTAINING ALCOHOL: NEVER

## 2024-12-14 NOTE — ED PROVIDER NOTES
EMERGENCY DEPARTMENT HISTORY AND PHYSICAL EXAM        Date: 12/14/2024  Patient Name: Alejandrina Pavon    History of Presenting Illness     Chief Complaint   Patient presents with    Back Pain    Hip Pain       History Provided By: History obtained from patient    HPI: Alejandrina Pavon, 74 y.o. female presents to the ED with cc of right hip pain acute on chronic x 5 days, right shoulder pain x 10 days    Patient reports history of right sided sciatica, hip bursitis for several years.  Before the pandemic she went to physical therapy but stopped in 2021.  She said this really helped her symptoms but they have been intermittent since that time.  She denies any numbness tingling or weakness in the right lower extremity no loss of bowel or bladder control.  Over the last 5 days she has had persistent pain in the right low back and hip.  Denies any provocative movements that correlate with the onset of her pain.  She states that in the past she has used a cane or a walker to ambulate at home but she has not required either of these recently.  She called an orthopedic office but was not able to get an appointment until January.  She would like to have a cortisone injection in her hip in the emergency department today.    Patient also endorsing right shoulder pain over the last 10 days.  Location of pain is at the AC joint and is aggravated with overhead extension of the shoulder.  She denies any numbness tingling or weakness in the arm hand or wrist.      No nausea, vomiting, diarrhea, fever, chills, chest pain, shortness of breath, leg swelling     There are no other complaints, changes, or physical findings at this time.    Records Reviewed: na    PCP: Luiza Tubbs MD    No current facility-administered medications on file prior to encounter.     Current Outpatient Medications on File Prior to Encounter   Medication Sig Dispense Refill    vitamin D (ERGOCALCIFEROL) 1.25 MG (68132 UT) CAPS capsule Take 1 capsule by

## 2024-12-14 NOTE — ED TRIAGE NOTES
Pt c/o right lower back and hip pain that started several days ago. States it is her sciatica. Can't get an appt with her doctor. Pt drove herself hre

## 2024-12-14 NOTE — DISCHARGE INSTRUCTIONS
Helpful therapies for low back pain include heat pads, massage, topical pain relievers such as BenGay, and maintaining your mobility. Do not rest in bed until the pain goes away, you will have to ambulate to restore functional status to the back and only lie in bed if pain is intolerable or new symptoms develop.Take over the counter Tylenol as needed during back strain.    Return to ED if: neurological deficit in legs or saddle region (numb, tingling, weak), incontinence or urinary retention, abrupt increases in pain, or unresolved pain.

## 2025-01-06 NOTE — PROGRESS NOTES
Patient: Alejandrina Pavon                MRN: [id number]       SSN: [id number]  YOB: 1950        AGE: 74 y.o.        SEX: female      PCP: Luiza Tubbs MD  01/08/25    Chief Complaint   Patient presents with    Hip Pain     Right hip      HISTORY:  Alejandrina Pavon is a 74 y.o. female  seen for 1 month history of  right hip and lower back pains.  She denies any recent injury. She feels pain in her right lateral hip and lower back with standing and walking. Her lower back and right hip pain radiates down her right leg to her foot. Her hip and lower back stiffens up after she sits for long periods of time. She has had to start using a single point cane due to her pain.  Pelvis MRI scan 7/22/2019 revealed evidence of trochanteric bursitis.    Occupation, etc: Ms. Pavon is retired. She previously worked as a  for [employer].  She lives alone in [place]. She weighs 325 lbs and is 5'6. She is not diabetic or hypertensive.   Wt Readings from Last 3 Encounters:   01/08/25 (!) 147.4 kg (325 lb)   12/14/24 (!) 140.6 kg (310 lb)   05/04/23 (!) 147.2 kg (324 lb 9 oz)      Body mass index is 52.46 kg/m².    Patient Active Problem List   Diagnosis    Dyslipidemia    Morbid obesity    Essential hypertension    Hypertensive heart disease without heart failure       Social History     Tobacco Use    Smoking status: Never     Passive exposure: Never    Smokeless tobacco: Never   Vaping Use    Vaping status: Never Used   Substance Use Topics    Alcohol use: No    Drug use: No        No Known Allergies     Current Outpatient Medications   Medication Sig    vitamin D (ERGOCALCIFEROL) 1.25 MG (69380 UT) CAPS capsule Take 1 capsule by mouth once a week Thursday    latanoprost (XALATAN) 0.005 % ophthalmic solution Place 1 drop into both eyes nightly    amLODIPine (NORVASC) 10 MG tablet Take 1 tablet by mouth nightly    aspirin 81 MG EC tablet Take by mouth nightly    losartan

## 2025-01-08 ENCOUNTER — OFFICE VISIT (OUTPATIENT)
Age: 75
End: 2025-01-08

## 2025-01-08 VITALS — WEIGHT: 293 LBS | HEIGHT: 66 IN | BODY MASS INDEX: 47.09 KG/M2

## 2025-01-08 DIAGNOSIS — M79.10 MYALGIA: ICD-10-CM

## 2025-01-08 DIAGNOSIS — G89.29 CHRONIC HIP PAIN, RIGHT: Primary | ICD-10-CM

## 2025-01-08 DIAGNOSIS — M25.551 CHRONIC HIP PAIN, RIGHT: Primary | ICD-10-CM

## 2025-01-08 DIAGNOSIS — M54.50 LUMBAR PAIN: ICD-10-CM

## 2025-01-08 DIAGNOSIS — M16.11 UNILATERAL PRIMARY OSTEOARTHRITIS, RIGHT HIP: ICD-10-CM

## 2025-01-08 RX ORDER — BETAMETHASONE SODIUM PHOSPHATE AND BETAMETHASONE ACETATE 3; 3 MG/ML; MG/ML
3 INJECTION, SUSPENSION INTRA-ARTICULAR; INTRALESIONAL; INTRAMUSCULAR; SOFT TISSUE ONCE
Status: COMPLETED | OUTPATIENT
Start: 2025-01-08 | End: 2025-01-08

## 2025-01-08 RX ADMIN — BETAMETHASONE SODIUM PHOSPHATE AND BETAMETHASONE ACETATE 3 MG: 3; 3 INJECTION, SUSPENSION INTRA-ARTICULAR; INTRALESIONAL; INTRAMUSCULAR; SOFT TISSUE at 09:44

## 2025-01-24 NOTE — PROGRESS NOTES
Patient: Alejandrina Pavon                MRN: [id number]       SSN: [id number]  YOB: 1950        AGE: 74 y.o.        SEX: female      PCP: Luiza Tubbs MD  01/30/25    Chief Complaint   Patient presents with    Knee Pain     bilateral     HISTORY:  Alejandrina Pavon is a 74 y.o. female seen for 1 month history of  knee pain (R>L).  She denies any recent injury or falls.  She feels pain with standing, walking and stair climbing.  She experiences startup pain after sitting.     She was previously seen for right hip and lower back pains--doing better today. She has had to start using a single point cane due to her pain.  Pelvis MRI scan 7/22/2019 revealed evidence of trochanteric bursitis.     Occupation, etc: Ms. Pavon is retired. She previously worked as a  for [employer].  She lives alone in [place]. She weighs 320 lbs and is 5'6. She is not diabetic. She has a h/o hypertension.   Wt Readings from Last 3 Encounters:   01/30/25 (!) 145.2 kg (320 lb)   01/08/25 (!) 147.4 kg (325 lb)   12/14/24 (!) 140.6 kg (310 lb)      Body mass index is 51.65 kg/m².    Patient Active Problem List   Diagnosis    Dyslipidemia    Morbid obesity    Essential hypertension    Hypertensive heart disease without heart failure       Social History     Tobacco Use    Smoking status: Never     Passive exposure: Never    Smokeless tobacco: Never   Vaping Use    Vaping status: Never Used   Substance Use Topics    Alcohol use: No    Drug use: No        No Known Allergies     Current Outpatient Medications   Medication Sig    vitamin D (ERGOCALCIFEROL) 1.25 MG (33351 UT) CAPS capsule Take 1 capsule by mouth once a week Thursday    latanoprost (XALATAN) 0.005 % ophthalmic solution Place 1 drop into both eyes nightly    amLODIPine (NORVASC) 10 MG tablet Take 1 tablet by mouth nightly    aspirin 81 MG EC tablet Take by mouth nightly    losartan (COZAAR) 100 MG tablet Take 1 tablet by mouth at

## 2025-01-30 ENCOUNTER — OFFICE VISIT (OUTPATIENT)
Age: 75
End: 2025-01-30

## 2025-01-30 VITALS — BODY MASS INDEX: 47.09 KG/M2 | WEIGHT: 293 LBS | HEIGHT: 66 IN | TEMPERATURE: 98.2 F

## 2025-01-30 DIAGNOSIS — M25.561 ACUTE PAIN OF RIGHT KNEE: ICD-10-CM

## 2025-01-30 DIAGNOSIS — M17.11 UNILATERAL PRIMARY OSTEOARTHRITIS, RIGHT KNEE: ICD-10-CM

## 2025-01-30 DIAGNOSIS — M17.12 UNILATERAL PRIMARY OSTEOARTHRITIS, LEFT KNEE: ICD-10-CM

## 2025-01-30 DIAGNOSIS — M25.562 ACUTE PAIN OF LEFT KNEE: Primary | ICD-10-CM

## 2025-01-30 RX ORDER — BETAMETHASONE SODIUM PHOSPHATE AND BETAMETHASONE ACETATE 3; 3 MG/ML; MG/ML
3 INJECTION, SUSPENSION INTRA-ARTICULAR; INTRALESIONAL; INTRAMUSCULAR; SOFT TISSUE ONCE
Status: COMPLETED | OUTPATIENT
Start: 2025-01-30 | End: 2025-01-30

## 2025-01-30 RX ORDER — BUPIVACAINE HYDROCHLORIDE 5 MG/ML
4 INJECTION, SOLUTION PERINEURAL ONCE
Status: COMPLETED | OUTPATIENT
Start: 2025-01-30 | End: 2025-01-30

## 2025-01-30 RX ADMIN — BETAMETHASONE SODIUM PHOSPHATE AND BETAMETHASONE ACETATE 3 MG: 3; 3 INJECTION, SUSPENSION INTRA-ARTICULAR; INTRALESIONAL; INTRAMUSCULAR; SOFT TISSUE at 14:42

## 2025-01-30 RX ADMIN — BUPIVACAINE HYDROCHLORIDE 20 MG: 5 INJECTION, SOLUTION PERINEURAL at 14:43

## 2025-02-17 ENCOUNTER — TELEPHONE (OUTPATIENT)
Facility: HOSPITAL | Age: 75
End: 2025-02-17

## 2025-02-18 ENCOUNTER — HOSPITAL ENCOUNTER (OUTPATIENT)
Facility: HOSPITAL | Age: 75
Setting detail: RECURRING SERIES
Discharge: HOME | End: 2025-02-21
Payer: MEDICARE

## 2025-02-18 PROCEDURE — 97161 PT EVAL LOW COMPLEX 20 MIN: CPT

## 2025-02-18 PROCEDURE — 97116 GAIT TRAINING THERAPY: CPT

## 2025-02-18 PROCEDURE — 97110 THERAPEUTIC EXERCISES: CPT

## 2025-02-18 NOTE — PROGRESS NOTES
PHYSICAL / OCCUPATIONAL THERAPY - DAILY TREATMENT NOTE AND KNEE EVALUATION    Patient Name: Alejandrina Pavon    Date: 2025    : 1950  Insurance: Payor: MEDICARE / Plan: MEDICARE PART A AND B / Product Type: *No Product type* /      Patient  verified Yes     Visit #   Current / Total 1 24   Time   In / Out 1230 108   Pain   In / Out 6 6   Subjective Functional Status/Changes: See Plan of Care     TREATMENT AREA =  Right knee pain [M25.561]  Pain in left knee [M25.562]    SUBJECTIVE  History/Mechanism of Injury: Alejandrina Pavon is a 74 y.o. female with Dx of Right knee pain [M25.561]  Pain in left knee [M25.562].  Reports Right knee > Left knee pain onset late Dec  denies fall or near fall.  Started using cane d/t pain about the same time   Functional Limitation: pain with initial sit  to stand transfers especially in the morning, decreased standing and walking tolerance < 10 minutes: difficulty with  running errands and cooking ,  pain and fear of falling affecting ADL:  not able to use shower or tub,   Right  knee pain affecting sleep wakes up 2-3X per night   Prior level of function: independent   Comorbidity/Allergies:  Prediabetic HTN   Diagnostic Tests: Xray arthritic changes   Pain:  Worst: 10/10  Best: constant   Location: R knee > L   Aggravated by: getting out of bed   Alleviated by: tylenol and topical cream   Previous Treatment: none   Mobility Devices: cane   Falls: no   Living Situation: lives alone   Work History: retired   Social/Recreational Activities: return to [employer]      OBJECTIVE    20 min    [x]Eval (un-timed code)                   Therapeutic Procedures:  Tx Min Billable or 1:1 Min (if diff from Tx Min) Procedure, Rationale, Specifics   10  02076 Therapeutic Exercise (timed):  increase ROM, strength, coordination, balance, and proprioception to improve patient's ability to progress to PLOF and address remaining functional goals. (see flow sheet as applicable)

## 2025-02-18 NOTE — PROGRESS NOTES
[facility]  [address] [facility] [address] [place] [address] Ph:[phone] Fx: [phone]    PLAN OF CARE/ Statement of Necessity for Physical Therapy Services           Patient name: Alejandrina Pavon Start of Care: 2025   Referral source: David Villar MD : 1950    Medical Diagnosis: Right knee pain [M25.561]  Pain in left knee [M25.562]       Onset Date: Late Dec 2024   Treatment Diagnosis: M25.561  RIGHT KNEE PAIN and M25.562  LEFT KNEE PAIN                                      Prior Hospitalization: see medical history Provider#: [id number]     Comorbidities: Other: :  Prediabetic HTN    Prior Level of Function:  independent     The Plan of Care and following information is based on the information from the initial evaluation.    Assessment / key information:  Patient is 74 year old female presenting to PT with complaints of DX Right  knee and Left knee pain .   Reports Right knee > Left knee pain onset late Dec  denies fall or near fall.  Started using cane d/t pain about the same time   Self reported functional Limitation: pain with initial sit  to stand transfers especially in the morning, decreased standing and walking tolerance < 10 minutes: difficulty with  running errands and cooking ,  pain and fear of falling affecting ADL:  not able to use shower or tub,   Right  knee pain affecting sleep wakes up 2-3X per night lifting, difficulty with squatting lifting , doing household chores   PT evaluation reveals decreased bilateral knee  AROM, tenderness of joint line  , strength impairments, functional movement abnormalities, balance limitations and flexibility deficits.    Patient would benefit from skilled PT to address noted impairments in order to return to PLOF, maintain independence and reduce risk of further debility.      Evaluation Complexity:  History:  MEDIUM  Complexity : 1-2 comorbidities / personal factors will impact the outcome/

## 2025-02-24 ENCOUNTER — HOSPITAL ENCOUNTER (OUTPATIENT)
Facility: HOSPITAL | Age: 75
Setting detail: RECURRING SERIES
Discharge: HOME | End: 2025-02-27
Payer: MEDICARE

## 2025-02-24 PROCEDURE — 97112 NEUROMUSCULAR REEDUCATION: CPT

## 2025-02-24 PROCEDURE — 97110 THERAPEUTIC EXERCISES: CPT

## 2025-02-24 NOTE — PROGRESS NOTES
Patient: Alejandrina Pavon                MRN: [id number]       SSN: [id number]  YOB: 1950        AGE: 74 y.o.        SEX: female      PCP: Luiza Tubbs MD  02/26/25    No chief complaint on file.    HISTORY:  Alejandrina Pavon is a 74 y.o. female who is seen for bilateral knee pain. She presents today for her first injection in the Euflexxa visco supplementation series.    She was previously seen for knee pain (R>L).  She denies any recent injury or falls.  She feels pain with standing, walking and stair climbing.  She experiences startup pain after sitting.      She was previously seen for right hip and lower back pains--doing better today. She has had to start using a single point cane due to her pain.  Pelvis MRI scan 7/22/2019 revealed evidence of trochanteric bursitis.     Occupation, etc: Ms. Pavon is retired. She previously worked as a  for [employer].  She lives alone in [place]. She weighs 320 lbs and is 5'6. She is not diabetic. She has a h/o hypertension.   Wt Readings from Last 3 Encounters:   01/30/25 (!) 145.2 kg (320 lb)   01/08/25 (!) 147.4 kg (325 lb)   12/14/24 (!) 140.6 kg (310 lb)      There is no height or weight on file to calculate BMI.    Patient Active Problem List   Diagnosis    Dyslipidemia    Morbid obesity    Essential hypertension    Hypertensive heart disease without heart failure       Social History     Tobacco Use    Smoking status: Never     Passive exposure: Never    Smokeless tobacco: Never   Vaping Use    Vaping status: Never Used   Substance Use Topics    Alcohol use: No    Drug use: No        No Known Allergies     Current Outpatient Medications   Medication Sig    vitamin D (ERGOCALCIFEROL) 1.25 MG (16887 UT) CAPS capsule Take 1 capsule by mouth once a week Thursday    latanoprost (XALATAN) 0.005 % ophthalmic solution Place 1 drop into both eyes nightly    amLODIPine (NORVASC) 10 MG tablet Take 1 tablet by mouth nightly

## 2025-02-24 NOTE — PROGRESS NOTES
PHYSICAL / OCCUPATIONAL THERAPY - DAILY TREATMENT NOTE     Patient Name: Alejandrina Pavon    Date: 2025    : 1950  Insurance: Payor: MEDICARE / Plan: MEDICARE PART A AND B / Product Type: *No Product type* /      Patient  verified Yes     Visit #   Current / Total 2 24   Time   In / Out 1:50 2:32   Pain   In / Out 7/10 6/10   Subjective Functional Status/Changes: No new complaints.   Changes to:  Allergies, Med Hx, Sx Hx?   no       TREATMENT AREA =  Right knee pain [M25.561]  Pain in left knee [M25.562]    If an interpreting service is utilized for treatment of this patient, the contents of this document represent the material reviewed with the patient via the .     OBJECTIVE    Therapeutic Procedures:  Tx Min Billable or 1:1 Min (if diff from Tx Min) Procedure, Rationale, Specifics   34  29948 Therapeutic Exercise (timed):  increase ROM, strength, coordination, balance, and proprioception to improve patient's ability to progress to PLOF and address remaining functional goals. (see flow sheet as applicable)    Details if applicable:       8  39342 Neuromuscular Re-Education (timed):  improve balance, coordination, kinesthetic sense, posture, core stability and proprioception to improve patient's ability to develop conscious control of individual muscles and awareness of position of extremities in order to progress to PLOF and address remaining functional goals. (see flow sheet as applicable)    Details if applicable:     42  [facility] Totals Reminder: bill using total billable min of TIMED therapeutic procedures (example: do not include dry needle or estim unattended, both untimed codes, in totals to left)  8-22 min = 1 unit; 23-37 min = 2 units; 38-52 min = 3 units; 53-67 min = 4 units; 68-82 min = 5 units   Total Total     TOTAL TREATMENT TIME:        42     [x]  Patient Education billed concurrently with other procedures   [x] Review HEP    [] Progressed/Changed HEP, detail:    [] Other

## 2025-02-26 ENCOUNTER — OFFICE VISIT (OUTPATIENT)
Age: 75
End: 2025-02-26
Payer: MEDICARE

## 2025-02-26 VITALS — HEIGHT: 66 IN | WEIGHT: 293 LBS | BODY MASS INDEX: 47.09 KG/M2

## 2025-02-26 DIAGNOSIS — M17.11 UNILATERAL PRIMARY OSTEOARTHRITIS, RIGHT KNEE: Primary | ICD-10-CM

## 2025-02-26 DIAGNOSIS — M17.12 UNILATERAL PRIMARY OSTEOARTHRITIS, LEFT KNEE: ICD-10-CM

## 2025-02-26 PROCEDURE — 20610 DRAIN/INJ JOINT/BURSA W/O US: CPT | Performed by: SPECIALIST

## 2025-02-26 RX ORDER — HYALURONATE SODIUM 10 MG/ML
20 SYRINGE (ML) INTRAARTICULAR ONCE
Status: COMPLETED | OUTPATIENT
Start: 2025-02-26 | End: 2025-02-26

## 2025-02-26 RX ADMIN — Medication 20 MG: at 09:45

## 2025-03-03 ENCOUNTER — HOSPITAL ENCOUNTER (OUTPATIENT)
Facility: HOSPITAL | Age: 75
Setting detail: RECURRING SERIES
Discharge: HOME | End: 2025-03-06
Payer: MEDICARE

## 2025-03-03 PROCEDURE — 97530 THERAPEUTIC ACTIVITIES: CPT

## 2025-03-03 PROCEDURE — 97110 THERAPEUTIC EXERCISES: CPT

## 2025-03-03 PROCEDURE — 97112 NEUROMUSCULAR REEDUCATION: CPT

## 2025-03-03 NOTE — PROGRESS NOTES
at evaluation: TUG 29 sec     - Patient will improve LEFS score to at least 41 /80 to indicate decreased self perceived debility and clinically significant improved in condition  Status at evaluation: 16 /80     Patient will improve 5 X sit <> stand < or = to 19 sec to improve ability to perform transfers and ambulation and to decrease fall risk  Status at evaluation: 33   sec      Patient will improve B  knee strength to flexion 4/5   and ext  4-/5 in order to improve gait, safely negotiate stairs for a safe return to PLOF   Status at evaluation   Knee Extension Right 3+/5 Left 3+/5   Knee Flexion Right 3+/5 Left 4-/5      Patient be compliant with HEP at the end of care to enhance carry over of functional gains made with skilled therapy services in order to improve quality of life    PLAN  Yes  Continue plan of care  []  Upgrade activities as tolerated  []  Discharge due to :  []  Other:    Jackie Bal PT, CMTPT    3/3/2025    1:57 PM    Future Appointments   Date Time Provider Department Center   3/5/2025  1:50 PM Daniela Jacob, PT [facility]   3/10/2025 12:30 PM Juanpablo Angel, PTA [facility]   3/12/2025  9:10 AM David Villar MD [facility]   3/13/2025  1:50 PM Jackie Bal, PT [facility]   3/17/2025  1:50 PM Jackie Bal, PT [facility]   3/19/2025  1:00 PM Jovana Lange MD [facility]   3/20/2025 10:30 AM Juanpablo Angel, PTA [facility]   3/24/2025 12:30 PM Juanpablo Angel, PTA [facility]   3/27/2025 11:50 AM Juanpablo Angel, PTA [facility]   3/31/2025 11:10 AM Jackie Bal, PT [facility]   4/3/2025 11:10 AM Jackie Bal, PT [facility]

## 2025-03-05 ENCOUNTER — HOSPITAL ENCOUNTER (OUTPATIENT)
Facility: HOSPITAL | Age: 75
Setting detail: RECURRING SERIES
Discharge: HOME | End: 2025-03-08
Payer: MEDICARE

## 2025-03-05 PROCEDURE — 97112 NEUROMUSCULAR REEDUCATION: CPT

## 2025-03-05 PROCEDURE — 97110 THERAPEUTIC EXERCISES: CPT

## 2025-03-05 PROCEDURE — 97530 THERAPEUTIC ACTIVITIES: CPT

## 2025-03-05 NOTE — PROGRESS NOTES
adverse reaction:         Therapeutic Procedures:  Tx Min Billable or 1:1 Min (if diff from Tx Min) Procedure, Rationale, Specifics   20  61276 Therapeutic Exercise (timed):  increase ROM, strength, coordination, balance, and proprioception to improve patient's ability to progress to PLOF and address remaining functional goals. (see flow sheet as applicable)    Details if applicable:       12  10848 Neuromuscular Re-Education (timed):  improve balance, coordination, kinesthetic sense, posture, core stability and proprioception to improve patient's ability to develop conscious control of individual muscles and awareness of position of extremities in order to progress to PLOF and address remaining functional goals. (see flow sheet as applicable)    Details if applicable:     10  26891 Therapeutic Activity (timed):  use of dynamic activities replicating functional movements to increase ROM, strength, coordination, balance, and proprioception in order to improve patient's ability to progress to PLOF and address remaining functional goals.  (see flow sheet as applicable)     Details if applicable:           Details if applicable:            Details if applicable:     42  [facility] Totals Reminder: bill using total billable min of TIMED therapeutic procedures (example: do not include dry needle or estim unattended, both untimed codes, in totals to left)  8-22 min = 1 unit; 23-37 min = 2 units; 38-52 min = 3 units; 53-67 min = 4 units; 68-82 min = 5 units   Total Total     TOTAL TREATMENT TIME:        52     [x]  Patient Education billed concurrently with other procedures   [x] Review HEP    [] Progressed/Changed HEP, detail:    [] Other detail:       Objective Information/Functional Measures/Assessment    STS assessed today, improvements noted. SAQ added to progress towards the strength goal. Pt tolerated the tx well and left in no apparent distress/less pain.    Patient will continue to benefit from skilled PT / OT services

## 2025-03-10 ENCOUNTER — HOSPITAL ENCOUNTER (OUTPATIENT)
Facility: HOSPITAL | Age: 75
Setting detail: RECURRING SERIES
Discharge: HOME | End: 2025-03-13
Payer: MEDICARE

## 2025-03-10 PROCEDURE — 97110 THERAPEUTIC EXERCISES: CPT

## 2025-03-10 PROCEDURE — 97530 THERAPEUTIC ACTIVITIES: CPT

## 2025-03-10 NOTE — PROGRESS NOTES
PHYSICAL / OCCUPATIONAL THERAPY - DAILY TREATMENT NOTE     Patient Name: Alejandrina Pavon    Date: 3/10/2025    : 1950  Insurance: Payor: MEDICARE / Plan: MEDICARE PART A AND B / Product Type: *No Product type* /      Patient  verified Yes     Visit #   Current / Total 5 24   Time   In / Out 12:34 1:20   Pain   In / Out 6/10 6/10   Subjective Functional Status/Changes: No new complaints.   Changes to:  Allergies, Med Hx, Sx Hx?   no       TREATMENT AREA =  Right knee pain [M25.561]  Pain in left knee [M25.562]    If an interpreting service is utilized for treatment of this patient, the contents of this document represent the material reviewed with the patient via the .     OBJECTIVE    Modalities Rationale:     decrease pain and increase tissue extensibility to improve patient's ability to progress to PLOF and address remaining functional goals.     min [] Estim Unattended, type/location:                                      []  w/ice    []  w/heat    min [] Estim Attended, type/location:                                     []  w/US     []  w/ice    []  w/heat    []  TENS insruct      min []  Mechanical Traction: type/lbs                   []  pro   []  sup   []  int   []  cont    []  before manual    []  after manual    min []  Ultrasound, settings/location:      min []  Iontophoresis w/ dexamethasone, location:                                               []  take home patch       []  in clinic     10   min  unbilled []  Ice     [x]  Heat    location/position:  (B) knees - Pt supine with wedge    min []  Paraffin,  details:     min []  Vasopneumatic Device, press/temp:     min []  Whirlpool / Fluido:    If using vaso (only need to measure limb vaso being performed on)      pre-treatment girth :       post-treatment girth :       measured at (landmark location) :      min []  Other:    Skin assessment post-treatment (if applicable):    []  intact    []  redness- no adverse reaction

## 2025-03-12 ENCOUNTER — OFFICE VISIT (OUTPATIENT)
Age: 75
End: 2025-03-12

## 2025-03-12 DIAGNOSIS — M17.11 UNILATERAL PRIMARY OSTEOARTHRITIS, RIGHT KNEE: Primary | ICD-10-CM

## 2025-03-12 DIAGNOSIS — M17.12 UNILATERAL PRIMARY OSTEOARTHRITIS, LEFT KNEE: ICD-10-CM

## 2025-03-12 DIAGNOSIS — R53.81 DEBILITY: ICD-10-CM

## 2025-03-12 RX ORDER — HYALURONATE SODIUM 10 MG/ML
20 SYRINGE (ML) INTRAARTICULAR ONCE
Status: COMPLETED | OUTPATIENT
Start: 2025-03-12 | End: 2025-03-12

## 2025-03-12 RX ADMIN — Medication 20 MG: at 09:44

## 2025-03-12 RX ADMIN — Medication 20 MG: at 09:45

## 2025-03-12 NOTE — PROGRESS NOTES
Patient: Alejandrina Pavon                MRN: [id number]       SSN: [id number]  YOB: 1950        AGE: 74 y.o.        SEX: female  There is no height or weight on file to calculate BMI.    PCP: Luiza Tubbs MD  03/12/25    Chief Complaint   Patient presents with    Knee Pain     bilateral     HISTORY:  Alejandrina Pavon is a 74 y.o. female who is seen for bilateral  knee pain. She presents today for her second injection in the Euflexxa visco supplementation series.       ICD-10-CM    1. Unilateral primary osteoarthritis, right knee  M17.11 DRAIN/INJECT LARGE JOINT/BURSA     sodium hyaluronate (EUFLEXXA, HYALGAN) injection 20 mg      2. Unilateral primary osteoarthritis, left knee  M17.12 DRAIN/INJECT LARGE JOINT/BURSA     sodium hyaluronate (EUFLEXXA, HYALGAN) injection 20 mg      3. Debility  R53.81 DME Order for (Specify) as OP        PROCEDURE:  Ms. Guillaume knees injected with 2 cc of Euflexxa.     Chart reviewed for the following:   David CARMONA MD, have reviewed the History, Physical and updated the Allergic reactions for Alejandrina Pavon     TIME OUT performed immediately prior to start of procedure:  David CARMONA MD, have performed the following reviews on Alejandrina Pavon prior to the start of the procedure:            * Patient was identified by name and date of birth   * Agreement on procedure being performed was verified  * Risks and Benefits explained to the patient  * Procedure site verified and marked as necessary  * Patient was positioned for comfort  * Consent was obtained     Time: 9:41 AM     Date of procedure: 3/12/2025    Procedure performed by:  David Villar MD    Ms. Pavon tolerated the procedure well with no complications.    PLAN: Ms. Guillaume knees injected with 2 cc of Euflexxa. Ms. Pavon will follow up in one week to complete her visco supplementation injection series.    Documentation by yahir Francois, as documented by David ARRIOLA

## 2025-03-13 ENCOUNTER — HOSPITAL ENCOUNTER (OUTPATIENT)
Facility: HOSPITAL | Age: 75
Setting detail: RECURRING SERIES
End: 2025-03-13
Payer: MEDICARE

## 2025-03-13 ENCOUNTER — TELEPHONE (OUTPATIENT)
Facility: HOSPITAL | Age: 75
End: 2025-03-13

## 2025-03-17 ENCOUNTER — HOSPITAL ENCOUNTER (OUTPATIENT)
Facility: HOSPITAL | Age: 75
Setting detail: RECURRING SERIES
Discharge: HOME | End: 2025-03-20
Payer: MEDICARE

## 2025-03-17 PROCEDURE — 97530 THERAPEUTIC ACTIVITIES: CPT

## 2025-03-17 PROCEDURE — 97110 THERAPEUTIC EXERCISES: CPT

## 2025-03-17 PROCEDURE — 97112 NEUROMUSCULAR REEDUCATION: CPT

## 2025-03-17 NOTE — PROGRESS NOTES
PHYSICAL / OCCUPATIONAL THERAPY - DAILY TREATMENT NOTE (updated )    Patient Name: Alejandrina Pavon    Date: 3/17/2025    : 1950  Insurance: Payor: MEDICARE / Plan: MEDICARE PART A AND B / Product Type: *No Product type* /      Patient  verified Yes     Visit #   Current / Total 6 24   Time   In / Out 157 245   Pain   In / Out 4 5   Subjective Functional Status/Changes: No new complaints.    Changes to:  Meds, Allergies, Med Hx, Sx Hx?  If yes, update Summary List no     If an interpreting service is utilized for treatment of this patient, the contents of this document represent the material reviewed with the patient via the .   TREATMENT AREA =  Right knee pain [M25.561]  Pain in left knee [M25.562]  OBJECTIVE    Modalities Rationale:     decrease pain to improve patient's ability to progress to PLOF and address remaining functional goals.     min [] Estim Unattended, type/location:                                      []  w/ice    []  w/heat    min [] Estim Attended, type/location:                                     []  w/US     []  w/ice    []  w/heat    []  TENS insruct      min []  Mechanical Traction: type/lbs                   []  pro   []  sup   []  int   []  cont    []  before manual    []  after manual    min []  Ultrasound, settings/location:      min []  Iontophoresis w/ dexamethasone, location:                                               []  take home patch       []  in clinic   10     min  unbilled []  Ice     [x]  Heat    location/position: Supine with wedge; B knees    min []  Paraffin,  details:     min []  Vasopneumatic Device, press/temp:     min []  Whirlpool / Fluido:    If using vaso (only need to measure limb vaso being performed on)      pre-treatment girth :       post-treatment girth :       measured at (landmark location) :      min []  Other:    Skin assessment post-treatment (if applicable):    []  intact    []  redness- no adverse reaction                 
improve LEFS score to at least 41 /80 to indicate decreased self perceived debility and clinically significant improved in condition  Status at evaluation: 16 /80  PN:21/80 3/17/25  Patient will improve 5 X sit <> stand < or = to 19 sec to improve ability to perform transfers and ambulation and to decrease fall risk  Status at evaluation: 33   sec   PN: Met, 5 STS- 15 secs w/o any UE support  03/05/2025  Patient will improve B  knee strength to flexion 4/5   and ext  4-/5 in order to improve gait, safely negotiate stairs for a safe return to PLOF   Status at evaluation   Knee Extension Right 3+/5 Left 3+/5   Knee Flexion Right 3+/5 Left 4-/5    PN:B knee ext 4-/5; B knee flex 3+/5  Patient be compliant with HEP at the end of care to enhance carry over of functional gains made with skilled therapy services in order to improve quality of life  PN:compliant with current HEP 3/17/25    Summary of Care/ Key Functional Changes: LEFS increased by 5 points; TUG improved by 8 seconds      ASSESSMENT/RECOMMENDATIONS: Patient has attended 5 sessions of therapy; TUG time has improved; LEFS increased by 5 points. Patient will benefit from continuing with PT to further progress with strength and stability.   Patient would benefit from the continuation of skilled rehab interventions for functional progress to achieving above stated clinically significant goals. Continue per plan of care.         Thank you for this referral.   Jackie Bal, PT 3/17/2025 2:02 PM

## 2025-03-20 ENCOUNTER — HOSPITAL ENCOUNTER (OUTPATIENT)
Facility: HOSPITAL | Age: 75
Setting detail: RECURRING SERIES
Discharge: HOME | End: 2025-03-23
Payer: MEDICARE

## 2025-03-20 PROCEDURE — 97110 THERAPEUTIC EXERCISES: CPT

## 2025-03-20 PROCEDURE — 97112 NEUROMUSCULAR REEDUCATION: CPT

## 2025-03-20 NOTE — PROGRESS NOTES
PHYSICAL / OCCUPATIONAL THERAPY - DAILY TREATMENT NOTE     Patient Name: Alejandrina Pavon    Date: 3/20/2025    : 1950  Insurance: Payor: MEDICARE / Plan: MEDICARE PART A AND B / Product Type: *No Product type* /      Patient  verified Yes     Visit #   Current / Total 7 24   Time   In / Out 10:38 11:22   Pain   In / Out 5/10 4/10   Subjective Functional Status/Changes: Pt reports (B) knee pain, Right 5/10, Left 3/10.   Changes to:  Allergies, Med Hx, Sx Hx?   no       TREATMENT AREA =  Right knee pain [M25.561]  Pain in left knee [M25.562]    If an interpreting service is utilized for treatment of this patient, the contents of this document represent the material reviewed with the patient via the .     OBJECTIVE    Modalities Rationale:     decrease pain and increase tissue extensibility to improve patient's ability to progress to PLOF and address remaining functional goals.     min [] Estim Unattended, type/location:                                      []  w/ice    []  w/heat    min [] Estim Attended, type/location:                                     []  w/US     []  w/ice    []  w/heat    []  TENS insruct      min []  Mechanical Traction: type/lbs                   []  pro   []  sup   []  int   []  cont    []  before manual    []  after manual    min []  Ultrasound, settings/location:      min []  Iontophoresis w/ dexamethasone, location:                                               []  take home patch       []  in clinic     10   min  unbilled []  Ice     [x]  Heat    location/position:  (B) knees - Pt reclined    min []  Paraffin,  details:     min []  Vasopneumatic Device, press/temp:     min []  Whirlpool / Fluido:    If using vaso (only need to measure limb vaso being performed on)      pre-treatment girth :       post-treatment girth :       measured at (landmark location) :      min []  Other:    Skin assessment post-treatment (if applicable):    []  intact    []  redness- no adverse

## 2025-03-21 NOTE — PROGRESS NOTES
Patient: Alejandrina Pavon                MRN: [id number]       SSN: [id number]  YOB: 1950        AGE: 74 y.o.        SEX: female  Body mass index is 51.33 kg/m².    PCP: Luiza Tubbs MD  03/26/25    Chief Complaint   Patient presents with    Follow-up     Bilateral knee       HISTORY:  Alejandrina Pavon is a 74 y.o. female who is seen for bilateral knee pain. She presents today for her third injection in the Euflexxa visco supplementation series.    PROCEDURE:  Ms. Guillaume knees injected with 2 cc of Euflexxa.     TIME OUT performed immediately prior to start of procedure:  David CARMONA MD, have performed the following reviews on Alejandrina Pavon prior to the start of the procedure:            * Patient was identified by name and date of birth   * Agreement on procedure being performed was verified  * Risks and Benefits explained to the patient  * Procedure site verified and marked as necessary  * Patient was positioned for comfort  * Consent was obtained     Time: 11:31 AM     Date of procedure: 3/26/2025    Procedure performed by:  David Villar MD    Ms. Pavon tolerated the procedure well with no complications      ICD-10-CM    1. Unilateral primary osteoarthritis, right knee  M17.11 DRAIN/INJECT LARGE JOINT/BURSA     sodium hyaluronate (EUFLEXXA, HYALGAN) injection 20 mg      2. Unilateral primary osteoarthritis, left knee  M17.12 DRAIN/INJECT LARGE JOINT/BURSA     sodium hyaluronate (EUFLEXXA, HYALGAN) injection 20 mg        PLAN: Ms. Guillaume knees injected with 2 cc of Euflexxa. Ms. Pavon will follow up PRN now that she has completed her visco supplementation injection series.     Documentation by yahir Francois, as documented by David Villar MD.

## 2025-03-24 ENCOUNTER — HOSPITAL ENCOUNTER (OUTPATIENT)
Facility: HOSPITAL | Age: 75
Setting detail: RECURRING SERIES
Discharge: HOME | End: 2025-03-27
Payer: MEDICARE

## 2025-03-24 PROCEDURE — 97530 THERAPEUTIC ACTIVITIES: CPT

## 2025-03-24 PROCEDURE — 97112 NEUROMUSCULAR REEDUCATION: CPT

## 2025-03-24 PROCEDURE — 97110 THERAPEUTIC EXERCISES: CPT

## 2025-03-24 NOTE — PROGRESS NOTES
PHYSICAL / OCCUPATIONAL THERAPY - DAILY TREATMENT NOTE     Patient Name: Alejandrina Pavon    Date: 3/24/2025    : 1950  Insurance: Payor: MEDICARE / Plan: MEDICARE PART A AND B / Product Type: *No Product type* /      Patient  verified Yes     Visit #   Current / Total 8 24   Time   In / Out 12:32 1:20   Pain   In / Out 6-10 10   Subjective Functional Status/Changes: Pt stated \"really hurting today.\"   Changes to:  Allergies, Med Hx, Sx Hx?   no       TREATMENT AREA =  Right knee pain [M25.561]  Pain in left knee [M25.562]    If an interpreting service is utilized for treatment of this patient, the contents of this document represent the material reviewed with the patient via the .     OBJECTIVE    Modalities Rationale:     decrease pain and increase tissue extensibility to improve patient's ability to progress to PLOF and address remaining functional goals.     min [] Estim Unattended, type/location:                                      []  w/ice    []  w/heat    min [] Estim Attended, type/location:                                     []  w/US     []  w/ice    []  w/heat    []  TENS insruct      min []  Mechanical Traction: type/lbs                   []  pro   []  sup   []  int   []  cont    []  before manual    []  after manual    min []  Ultrasound, settings/location:      min []  Iontophoresis w/ dexamethasone, location:                                               []  take home patch       []  in clinic     10   min  unbilled []  Ice     [x]  Heat    location/position:  (B) knees - Pt reclined    min []  Paraffin,  details:     min []  Vasopneumatic Device, press/temp:     min []  Whirlpool / Fluido:    If using vaso (only need to measure limb vaso being performed on)      pre-treatment girth :       post-treatment girth :       measured at (landmark location) :      min []  Other:    Skin assessment post-treatment (if applicable):    []  intact    []  redness- no adverse reaction

## 2025-03-26 ENCOUNTER — OFFICE VISIT (OUTPATIENT)
Age: 75
End: 2025-03-26

## 2025-03-26 VITALS — WEIGHT: 293 LBS | BODY MASS INDEX: 47.09 KG/M2 | HEIGHT: 66 IN

## 2025-03-26 DIAGNOSIS — M17.11 UNILATERAL PRIMARY OSTEOARTHRITIS, RIGHT KNEE: Primary | ICD-10-CM

## 2025-03-26 DIAGNOSIS — M17.12 UNILATERAL PRIMARY OSTEOARTHRITIS, LEFT KNEE: ICD-10-CM

## 2025-03-27 ENCOUNTER — HOSPITAL ENCOUNTER (OUTPATIENT)
Facility: HOSPITAL | Age: 75
Setting detail: RECURRING SERIES
Discharge: HOME | End: 2025-03-30
Payer: MEDICARE

## 2025-03-27 PROCEDURE — 97110 THERAPEUTIC EXERCISES: CPT

## 2025-03-27 PROCEDURE — 97112 NEUROMUSCULAR REEDUCATION: CPT

## 2025-03-27 PROCEDURE — 97530 THERAPEUTIC ACTIVITIES: CPT

## 2025-03-27 NOTE — PROGRESS NOTES
PN: Met, 5 STS- 15 secs w/o any UE support  03/05/2025  Patient will improve B  knee strength to flexion 4/5   and ext  4-/5 in order to improve gait, safely negotiate stairs for a safe return to PLOF   Status at evaluation   Knee Extension Right 3+/5 Left 3+/5   Knee Flexion Right 3+/5 Left 4-/5   PN:B knee ext 4-/5; B knee flex 3+/5  Current 3/24/2025: MMT B knee ext 4+/5; B knee flex 4/5.  Patient be compliant with HEP at the end of care to enhance carry over of functional gains made with skilled therapy services in order to improve quality of life  PN:compliant with current HEP 3/17/25    PLAN  Yes  Continue plan of care  []  Upgrade activities as tolerated  []  Discharge due to :  []  Other:    Juanpablo Angel, PTA    3/27/2025    11:54 AM    Future Appointments   Date Time Provider Department Center   3/31/2025 11:10 AM Jackie Bal, PT [facility]   4/1/2025 10:40 AM Raffi Delgadillo DO [facility]   4/3/2025 11:10 AM Jackie Bal, PT [facility]

## 2025-03-31 ENCOUNTER — HOSPITAL ENCOUNTER (OUTPATIENT)
Facility: HOSPITAL | Age: 75
Setting detail: RECURRING SERIES
Discharge: HOME | End: 2025-04-03
Payer: MEDICARE

## 2025-03-31 PROCEDURE — 97110 THERAPEUTIC EXERCISES: CPT

## 2025-03-31 PROCEDURE — 97112 NEUROMUSCULAR REEDUCATION: CPT

## 2025-03-31 PROCEDURE — 97530 THERAPEUTIC ACTIVITIES: CPT

## 2025-03-31 NOTE — PROGRESS NOTES
PHYSICAL / OCCUPATIONAL THERAPY - DAILY TREATMENT NOTE     Patient Name: Alejandrina Pavon    Date: 3/31/2025    : 1950  Insurance: Payor: MEDICARE / Plan: MEDICARE PART A AND B / Product Type: *No Product type* /      Patient  verified Yes     Visit #   Current / Total 10 24   Time   In / Out 1109 1201   Pain   In / Out 5 4   Subjective Functional Status/Changes: No new complaints.   Changes to:  Allergies, Med Hx, Sx Hx?   no       TREATMENT AREA =  Right knee pain [M25.561]  Pain in left knee [M25.562]    If an interpreting service is utilized for treatment of this patient, the contents of this document represent the material reviewed with the patient via the .     OBJECTIVE    Modalities Rationale:     decrease pain to improve patient's ability to progress to PLOF and address remaining functional goals.     min [] Estim Unattended, type/location:                                      []  w/ice    []  w/heat    min [] Estim Attended, type/location:                                     []  w/US     []  w/ice    []  w/heat    []  TENS insruct      min []  Mechanical Traction: type/lbs                   []  pro   []  sup   []  int   []  cont    []  before manual    []  after manual    min []  Ultrasound, settings/location:      min []  Iontophoresis w/ dexamethasone, location:                                               []  take home patch       []  in clinic   10     min  unbilled []  Ice     [x]  Heat    location/position: B knees;    min []  Paraffin,  details:     min []  Vasopneumatic Device, press/temp:     min []  Whirlpool / Fluido:    If using vaso (only need to measure limb vaso being performed on)      pre-treatment girth :       post-treatment girth :       measured at (landmark location) :      min []  Other:    Skin assessment post-treatment (if applicable):    []  intact    []  redness- no adverse reaction                 []redness - adverse reaction:         Therapeutic

## 2025-04-01 ENCOUNTER — HOSPITAL ENCOUNTER (OUTPATIENT)
Facility: HOSPITAL | Age: 75
Discharge: HOME | End: 2025-04-04

## 2025-04-01 ENCOUNTER — OFFICE VISIT (OUTPATIENT)
Age: 75
End: 2025-04-01
Payer: MEDICARE

## 2025-04-01 VITALS — WEIGHT: 293 LBS | BODY MASS INDEX: 47.09 KG/M2 | RESPIRATION RATE: 14 BRPM | HEIGHT: 66 IN

## 2025-04-01 DIAGNOSIS — M99.04 SACRAL REGION SOMATIC DYSFUNCTION: ICD-10-CM

## 2025-04-01 DIAGNOSIS — M51.362 DEGENERATION OF INTERVERTEBRAL DISC OF LUMBAR REGION WITH DISCOGENIC BACK PAIN AND LOWER EXTREMITY PAIN: Primary | ICD-10-CM

## 2025-04-01 DIAGNOSIS — M99.01 CERVICAL SOMATIC DYSFUNCTION: ICD-10-CM

## 2025-04-01 DIAGNOSIS — M51.362 DEGENERATION OF INTERVERTEBRAL DISC OF LUMBAR REGION WITH DISCOGENIC BACK PAIN AND LOWER EXTREMITY PAIN: ICD-10-CM

## 2025-04-01 DIAGNOSIS — M99.08 RIB CAGE REGION SOMATIC DYSFUNCTION: ICD-10-CM

## 2025-04-01 DIAGNOSIS — M99.06 LOWER LIMB REGION SOMATIC DYSFUNCTION: ICD-10-CM

## 2025-04-01 DIAGNOSIS — M99.03 SOMATIC DYSFUNCTION OF LUMBAR REGION: ICD-10-CM

## 2025-04-01 DIAGNOSIS — M99.09 SOMATIC DYSFUNCTION OF ABDOMINAL REGION: ICD-10-CM

## 2025-04-01 DIAGNOSIS — M99.05 PELVIC REGION SOMATIC DYSFUNCTION: ICD-10-CM

## 2025-04-01 DIAGNOSIS — M99.02 THORACIC REGION SOMATIC DYSFUNCTION: ICD-10-CM

## 2025-04-01 DIAGNOSIS — M99.07 UPPER EXTREMITY SOMATIC DYSFUNCTION: ICD-10-CM

## 2025-04-01 PROCEDURE — 1123F ACP DISCUSS/DSCN MKR DOCD: CPT | Performed by: FAMILY MEDICINE

## 2025-04-01 PROCEDURE — 1090F PRES/ABSN URINE INCON ASSESS: CPT | Performed by: FAMILY MEDICINE

## 2025-04-01 PROCEDURE — G8399 PT W/DXA RESULTS DOCUMENT: HCPCS | Performed by: FAMILY MEDICINE

## 2025-04-01 PROCEDURE — G8417 CALC BMI ABV UP PARAM F/U: HCPCS | Performed by: FAMILY MEDICINE

## 2025-04-01 PROCEDURE — 1160F RVW MEDS BY RX/DR IN RCRD: CPT | Performed by: FAMILY MEDICINE

## 2025-04-01 PROCEDURE — 3017F COLORECTAL CA SCREEN DOC REV: CPT | Performed by: FAMILY MEDICINE

## 2025-04-01 PROCEDURE — 99204 OFFICE O/P NEW MOD 45 MIN: CPT | Performed by: FAMILY MEDICINE

## 2025-04-01 PROCEDURE — G8427 DOCREV CUR MEDS BY ELIG CLIN: HCPCS | Performed by: FAMILY MEDICINE

## 2025-04-01 PROCEDURE — 1036F TOBACCO NON-USER: CPT | Performed by: FAMILY MEDICINE

## 2025-04-01 PROCEDURE — 1125F AMNT PAIN NOTED PAIN PRSNT: CPT | Performed by: FAMILY MEDICINE

## 2025-04-01 PROCEDURE — 1159F MED LIST DOCD IN RCRD: CPT | Performed by: FAMILY MEDICINE

## 2025-04-01 PROCEDURE — 98929 OSTEOPATH MANJ 9-10 REGIONS: CPT | Performed by: FAMILY MEDICINE

## 2025-04-01 RX ORDER — MELOXICAM 7.5 MG/1
7.5 TABLET ORAL DAILY PRN
Qty: 30 TABLET | Refills: 1 | Status: SHIPPED | OUTPATIENT
Start: 2025-04-01

## 2025-04-01 NOTE — PROGRESS NOTES
HISTORY OF PRESENT ILLNESS    Alejandrina Pavon 1950 is a 74 y.o. year old female comes in today as new patient for: low back pain    Patients symptoms have been present for 3-4 months.  Pain level 7/10 lower. It has worsened with stand, walk.  Patient has tried:  tylenol with benefit.  It is described as pain as above no injury.    IMAGING: XR lumbar pending    XR lumbar 7/11/2017  IMPRESSION:  1. No acute compression deformity or spondylolisthesis.  2. Significant L4-L5 degenerative changes as above. If clinically warranted  follow-up with MRI of the lumbar spine is recommended.    Past Surgical History:   Procedure Laterality Date    COLONOSCOPY      COLONOSCOPY N/A 4/25/2017    COLONOSCOPY with polyp biopsy performed by Abel Segura MD at [facility] ENDOSCOPY    COLONOSCOPY N/A 5/4/2023    COLONOSCOPY with polypectomy performed by Abel Segura MD at [facility] ENDOSCOPY    HYSTERECTOMY (CERVIX STATUS UNKNOWN)       Social History     Socioeconomic History    Marital status: Single     Spouse name: None    Number of children: None    Years of education: None    Highest education level: None   Tobacco Use    Smoking status: Never     Passive exposure: Never    Smokeless tobacco: Never   Vaping Use    Vaping status: Never Used   Substance and Sexual Activity    Alcohol use: Never    Drug use: Never    Sexual activity: Not Currently      Current Outpatient Medications   Medication Sig Dispense Refill    vitamin D (ERGOCALCIFEROL) 1.25 MG (07722 UT) CAPS capsule Take 1 capsule by mouth once a week Thursday      latanoprost (XALATAN) 0.005 % ophthalmic solution Place 1 drop into both eyes nightly      amLODIPine (NORVASC) 10 MG tablet Take 1 tablet by mouth nightly      aspirin 81 MG EC tablet Take by mouth nightly      losartan (COZAAR) 100 MG tablet Take 1 tablet by mouth at bedtime      rosuvastatin (CRESTOR) 10 MG tablet Take 1 tablet by mouth nightly       No current facility-administered medications for this visit.

## 2025-04-03 ENCOUNTER — TELEPHONE (OUTPATIENT)
Facility: HOSPITAL | Age: 75
End: 2025-04-03

## 2025-04-03 ENCOUNTER — HOSPITAL ENCOUNTER (OUTPATIENT)
Facility: HOSPITAL | Age: 75
Setting detail: RECURRING SERIES
End: 2025-04-03
Payer: MEDICARE

## 2025-04-09 ENCOUNTER — HOSPITAL ENCOUNTER (OUTPATIENT)
Facility: HOSPITAL | Age: 75
Setting detail: RECURRING SERIES
Discharge: HOME | End: 2025-04-12
Payer: MEDICARE

## 2025-04-09 PROCEDURE — 97110 THERAPEUTIC EXERCISES: CPT

## 2025-04-09 PROCEDURE — 97530 THERAPEUTIC ACTIVITIES: CPT

## 2025-04-09 PROCEDURE — 97112 NEUROMUSCULAR REEDUCATION: CPT

## 2025-04-09 NOTE — PROGRESS NOTES
Extension Right 3+/5 Left 3+/5   Knee Flexion Right 3+/5 Left 4-/5   PN:B knee ext 4-/5; B knee flex 3+/5  Current 3/24/2025: MMT B knee ext 4+/5; B knee flex 4/5.  Patient be compliant with HEP at the end of care to enhance carry over of functional gains made with skilled therapy services in order to improve quality of life  PN:compliant with current HEP 3/17/25    PLAN  Yes  Continue plan of care  []  Upgrade activities as tolerated  []  Discharge due to :  []  Other:    Jackie Bal PT, [facility]  4/9/2025    1:09 PM    Future Appointments   Date Time Provider Department Center   4/9/2025  1:10 PM Jackie Bal PT [facility]   4/11/2025  1:10 PM Jackie Bal PT [facility]   5/13/2025 11:00 AM Raffi Delgadillo DO [facility]

## 2025-04-11 ENCOUNTER — HOSPITAL ENCOUNTER (OUTPATIENT)
Facility: HOSPITAL | Age: 75
Setting detail: RECURRING SERIES
Discharge: HOME | End: 2025-04-14
Payer: MEDICARE

## 2025-04-11 PROCEDURE — 97110 THERAPEUTIC EXERCISES: CPT

## 2025-04-11 PROCEDURE — 97530 THERAPEUTIC ACTIVITIES: CPT

## 2025-04-11 PROCEDURE — 97112 NEUROMUSCULAR REEDUCATION: CPT

## 2025-04-11 NOTE — PROGRESS NOTES
PT DISCHARGE DAILY NOTE AND SUMMARY 3-25    If signature is requested please return to:    [facility]  Fax: [phone]    Date:2025  Patient name: Alejandrina Pavon Start of Care: 25   Referral source: David Villar MD : 1950   Medical/Treatment Diagnosis: Right knee pain  Pain in left knee Onset Date:2024     Prior Hospitalization: see medical history Provider#: [id number]   Comorbidities: Other: :  Prediabetic HTN     Prior Level of Function:  independent  Insurance: Payor: MEDICARE / Plan: MEDICARE PART A AND B / Product Type: *No Product type* /      Visits from Start of Care: 12 Missed Visits: 2    Medicare: Reporting Period (date from last assessment to current assessment): 3/17/25-25    Patient  verified yes     Visit #   Current / Total 12 24   Time   In / Out 110 202   Pain   In / Out 6 3   Subjective Functional Status/Changes: No new complaints.     TREATMENT AREA =  Right knee pain  Pain in left knee      OBJECTIVE    Modalities Rationale:     decrease pain to improve patient's ability to progress to PLOF and address remaining functional goals.     min [] Estim Unattended, type/location:                                      []  w/ice    []  w/heat    min [] Estim Attended, type/location:                                     []  w/US     []  w/ice    []  w/heat    []  TENS insruct      min []  Mechanical Traction: type/lbs                   []  pro   []  sup   []  int   []  cont    []  before manual    []  after manual    min []  Ultrasound, settings/location:      min  unbill []  Ice     []  Heat    location/position:     min []  Paraffin,  details:     min []  Vasopneumatic Device, press/temp:     min []  Whirlpool / Fluido:    If using vaso (only need to measure limb vaso being performed on)      pre-treatment girth :       post-treatment girth :       measured at (landmark location) :      min []  Other:    Skin assessment post-treatment:   Intact

## 2025-07-11 NOTE — PROGRESS NOTES
Patient: Alejandrina Pavon                MRN: [id number]       SSN: [id number]  YOB: 1950        AGE: 75 y.o.        SEX: female      PCP: Luiza Tubbs MD  07/14/25    Chief Complaint   Patient presents with    Knee Pain     bilateral    Lower back pain     right     HISTORY:  Alejandrina Pavon is a 75 y.o. female who is seen for increased bilateral knee and lower back pains. She successfully completed a bilateral Euflexxa series on 2/26/25 but her knee pains have returned. Her previous Euflexxa series did not help as much as she would have liked. She denies any recent injury or falls.  She feels knee and lower back pain with standing, walking and stair climbing.  She experiences startup pain after sitting.      She was previously seen for right hip and lower back pains--doing better today. She has had to start using a single point cane due to her pain.  Pelvis MRI scan 7/22/2019 revealed evidence of trochanteric bursitis. She was previously seen by Dr. Delgadillo for lower back pain.      Occupation, etc: Ms. Pavon is retired. She previously worked as a  for [employer].  She lives alone in [place]. She weighs 320 lbs and is 5'6. She is not diabetic. She has a h/o hypertension. She does not take diuretics.    Wt Readings from Last 3 Encounters:   07/14/25 (!) 144.2 kg (318 lb)   04/01/25 (!) 140.6 kg (310 lb)   03/26/25 (!) 144.2 kg (318 lb)      Body mass index is 51.33 kg/m².    Patient Active Problem List   Diagnosis    Dyslipidemia    Morbid obesity (HCC)    Essential hypertension    Hypertensive heart disease without heart failure       Social History     Tobacco Use    Smoking status: Never     Passive exposure: Never    Smokeless tobacco: Never   Vaping Use    Vaping status: Never Used   Substance Use Topics    Alcohol use: Never    Drug use: Never        No Known Allergies     Current Outpatient Medications   Medication Sig    meloxicam (MOBIC) 7.5 MG

## 2025-07-14 ENCOUNTER — OFFICE VISIT (OUTPATIENT)
Age: 75
End: 2025-07-14
Payer: MEDICARE

## 2025-07-14 VITALS — WEIGHT: 293 LBS | TEMPERATURE: 97.1 F | BODY MASS INDEX: 47.09 KG/M2 | HEIGHT: 66 IN

## 2025-07-14 DIAGNOSIS — M17.11 UNILATERAL PRIMARY OSTEOARTHRITIS, RIGHT KNEE: ICD-10-CM

## 2025-07-14 DIAGNOSIS — M79.10 MYALGIA: ICD-10-CM

## 2025-07-14 DIAGNOSIS — M54.50 LUMBAR PAIN: ICD-10-CM

## 2025-07-14 DIAGNOSIS — M25.561 CHRONIC PAIN OF RIGHT KNEE: ICD-10-CM

## 2025-07-14 DIAGNOSIS — G89.29 CHRONIC PAIN OF RIGHT KNEE: ICD-10-CM

## 2025-07-14 DIAGNOSIS — M25.551 RIGHT HIP PAIN: Primary | ICD-10-CM

## 2025-07-14 DIAGNOSIS — M16.11 UNILATERAL PRIMARY OSTEOARTHRITIS, RIGHT HIP: ICD-10-CM

## 2025-07-14 DIAGNOSIS — M17.12 UNILATERAL PRIMARY OSTEOARTHRITIS, LEFT KNEE: ICD-10-CM

## 2025-07-14 DIAGNOSIS — M25.562 CHRONIC PAIN OF LEFT KNEE: ICD-10-CM

## 2025-07-14 DIAGNOSIS — G89.29 CHRONIC PAIN OF LEFT KNEE: ICD-10-CM

## 2025-07-14 PROCEDURE — G8417 CALC BMI ABV UP PARAM F/U: HCPCS | Performed by: SPECIALIST

## 2025-07-14 PROCEDURE — 1036F TOBACCO NON-USER: CPT | Performed by: SPECIALIST

## 2025-07-14 PROCEDURE — 1160F RVW MEDS BY RX/DR IN RCRD: CPT | Performed by: SPECIALIST

## 2025-07-14 PROCEDURE — 1159F MED LIST DOCD IN RCRD: CPT | Performed by: SPECIALIST

## 2025-07-14 PROCEDURE — 3017F COLORECTAL CA SCREEN DOC REV: CPT | Performed by: SPECIALIST

## 2025-07-14 PROCEDURE — 20610 DRAIN/INJ JOINT/BURSA W/O US: CPT | Performed by: SPECIALIST

## 2025-07-14 PROCEDURE — 1090F PRES/ABSN URINE INCON ASSESS: CPT | Performed by: SPECIALIST

## 2025-07-14 PROCEDURE — G8427 DOCREV CUR MEDS BY ELIG CLIN: HCPCS | Performed by: SPECIALIST

## 2025-07-14 PROCEDURE — 20552 NJX 1/MLT TRIGGER POINT 1/2: CPT | Performed by: SPECIALIST

## 2025-07-14 PROCEDURE — 99213 OFFICE O/P EST LOW 20 MIN: CPT | Performed by: SPECIALIST

## 2025-07-14 PROCEDURE — 1125F AMNT PAIN NOTED PAIN PRSNT: CPT | Performed by: SPECIALIST

## 2025-07-14 PROCEDURE — G8399 PT W/DXA RESULTS DOCUMENT: HCPCS | Performed by: SPECIALIST

## 2025-07-14 PROCEDURE — 1123F ACP DISCUSS/DSCN MKR DOCD: CPT | Performed by: SPECIALIST

## 2025-07-14 RX ORDER — BETAMETHASONE SODIUM PHOSPHATE AND BETAMETHASONE ACETATE 3; 3 MG/ML; MG/ML
3 INJECTION, SUSPENSION INTRA-ARTICULAR; INTRALESIONAL; INTRAMUSCULAR; SOFT TISSUE ONCE
Status: CANCELLED | OUTPATIENT
Start: 2025-07-14 | End: 2025-07-14

## 2025-07-14 RX ORDER — BUPIVACAINE HYDROCHLORIDE 5 MG/ML
4 INJECTION, SOLUTION PERINEURAL ONCE
Status: COMPLETED | OUTPATIENT
Start: 2025-07-14 | End: 2025-07-14

## 2025-07-14 RX ORDER — BUPIVACAINE HYDROCHLORIDE 5 MG/ML
4 INJECTION, SOLUTION PERINEURAL ONCE
Status: CANCELLED | OUTPATIENT
Start: 2025-07-14 | End: 2025-07-14

## 2025-07-14 RX ORDER — BETAMETHASONE SODIUM PHOSPHATE AND BETAMETHASONE ACETATE 3; 3 MG/ML; MG/ML
3 INJECTION, SUSPENSION INTRA-ARTICULAR; INTRALESIONAL; INTRAMUSCULAR; SOFT TISSUE ONCE
Status: COMPLETED | OUTPATIENT
Start: 2025-07-14 | End: 2025-07-14

## 2025-07-14 RX ADMIN — BETAMETHASONE SODIUM PHOSPHATE AND BETAMETHASONE ACETATE 3 MG: 3; 3 INJECTION, SUSPENSION INTRA-ARTICULAR; INTRALESIONAL; INTRAMUSCULAR; SOFT TISSUE at 14:40

## 2025-07-14 RX ADMIN — BUPIVACAINE HYDROCHLORIDE 20 MG: 5 INJECTION, SOLUTION PERINEURAL at 14:41

## 2025-07-14 RX ADMIN — BETAMETHASONE SODIUM PHOSPHATE AND BETAMETHASONE ACETATE 3 MG: 3; 3 INJECTION, SUSPENSION INTRA-ARTICULAR; INTRALESIONAL; INTRAMUSCULAR; SOFT TISSUE at 14:41

## 2025-07-14 RX ADMIN — BUPIVACAINE HYDROCHLORIDE 20 MG: 5 INJECTION, SOLUTION PERINEURAL at 14:43

## (undated) DEVICE — CATHETER SUCT TR FL TIP 14FR W/ O CTRL

## (undated) DEVICE — CANNULA ORIG TL CLR W FOAM CUSHIONS AND 14FT SUPL TB 3 CHN

## (undated) DEVICE — MEDI-VAC NON-CONDUCTIVE SUCTION TUBING: Brand: CARDINAL HEALTH

## (undated) DEVICE — TRAP SPEC POLYP REM STRNR CLN DSGN MAGNIFYING WIND DISP

## (undated) DEVICE — GAUZE SPONGES,16 PLY: Brand: CURITY

## (undated) DEVICE — FLUFF AND POLYMER UNDERPAD,EXTRA HEAVY: Brand: WINGS

## (undated) DEVICE — FORCEPS BX L240CM JAW DIA2.8MM L CAP W/ NDL MIC MESH TOOTH

## (undated) DEVICE — CANNULA NSL AD TBNG L14FT STD PVC O2 CRV CONN NONFLARED NSL

## (undated) DEVICE — SYRINGE MED 50ML LUERSLIP TIP

## (undated) DEVICE — GOWN ISOL IMPERV UNIV, DISP, OPEN BACK, BLUE --

## (undated) DEVICE — GOWN PLASTIC FILM THMBHKS UNIV BLUE: Brand: CARDINAL HEALTH

## (undated) DEVICE — YANKAUER,SMOOTH HANDLE,HIGH CAPACITY: Brand: MEDLINE INDUSTRIES, INC.

## (undated) DEVICE — MEDI-VAC SUCTION HIGH CAPACITY: Brand: CARDINAL HEALTH

## (undated) DEVICE — (D)GLOVE EXAM LG NITRL NS -- DISC BY MFR NO SUB

## (undated) DEVICE — UNDERPAD INCONT W23XL36IN STD BLU POLYPR BK FLUF SFT

## (undated) DEVICE — SYRINGE MED 20ML STD CLR PLAS LUERLOCK TIP N CTRL DISP

## (undated) DEVICE — AIRLIFE™ NASAL OXYGEN CANNULA CURVED, NONFLARED TIP WITH 14 FOOT (4.3 M) CRUSH-RESISTANT TUBING, OVER-THE-EAR STYLE: Brand: AIRLIFE™

## (undated) DEVICE — SOLUTION IRRIG 1000ML H2O STRL BLT

## (undated) DEVICE — SYRINGE MED 25GA 3ML L5/8IN SUBQ PLAS W/ DETACH NDL SFTY

## (undated) DEVICE — LINER SUCT CANSTR 3000CC PLAS SFT PRE ASSEMB W/OUT TBNG W/

## (undated) DEVICE — (D)SYR 10ML 1/5ML GRAD NSAF -- PKGING CHANGE USE ITEM 338027

## (undated) DEVICE — SYR 50ML SLIP TIP NSAF LF STRL --

## (undated) DEVICE — ENDOSCOPY PUMP TUBING/ CAP SET: Brand: ERBE

## (undated) DEVICE — SNARE POLYP M W27MMXL240CM OVL STIFF DISP CAPTIVATOR

## (undated) DEVICE — SNARE VASC L240CM LOOP W10MM SHTH DIA2.4MM RND STIFF CLD

## (undated) DEVICE — SYRINGE MED 10ML LUERLOCK TIP W/O SFTY DISP

## (undated) DEVICE — SYRINGE 20ML LL S/C 50

## (undated) DEVICE — FLEX ADVANTAGE 3000CC: Brand: FLEX ADVANTAGE

## (undated) DEVICE — GAUZE,SPONGE,4"X4",16PLY,STRL,LF,10/TRAY: Brand: MEDLINE